# Patient Record
Sex: FEMALE | Race: WHITE | NOT HISPANIC OR LATINO
[De-identification: names, ages, dates, MRNs, and addresses within clinical notes are randomized per-mention and may not be internally consistent; named-entity substitution may affect disease eponyms.]

---

## 2017-12-14 ENCOUNTER — APPOINTMENT (OUTPATIENT)
Dept: OTOLARYNGOLOGY | Facility: CLINIC | Age: 62
End: 2017-12-14

## 2017-12-18 ENCOUNTER — APPOINTMENT (OUTPATIENT)
Dept: OTOLARYNGOLOGY | Facility: CLINIC | Age: 62
End: 2017-12-18
Payer: MEDICARE

## 2017-12-18 VITALS
WEIGHT: 190 LBS | BODY MASS INDEX: 34.96 KG/M2 | SYSTOLIC BLOOD PRESSURE: 165 MMHG | HEIGHT: 62 IN | DIASTOLIC BLOOD PRESSURE: 90 MMHG

## 2017-12-18 PROCEDURE — 99214 OFFICE O/P EST MOD 30 MIN: CPT | Mod: 25

## 2017-12-18 PROCEDURE — 31579 LARYNGOSCOPY TELESCOPIC: CPT

## 2018-01-06 ENCOUNTER — TRANSCRIPTION ENCOUNTER (OUTPATIENT)
Age: 63
End: 2018-01-06

## 2018-01-12 ENCOUNTER — OUTPATIENT (OUTPATIENT)
Dept: OUTPATIENT SERVICES | Facility: HOSPITAL | Age: 63
LOS: 1 days | End: 2018-01-12
Payer: MEDICARE

## 2018-01-12 VITALS
RESPIRATION RATE: 16 BRPM | SYSTOLIC BLOOD PRESSURE: 140 MMHG | DIASTOLIC BLOOD PRESSURE: 90 MMHG | WEIGHT: 181 LBS | HEIGHT: 62 IN | TEMPERATURE: 97 F | OXYGEN SATURATION: 98 % | HEART RATE: 80 BPM

## 2018-01-12 DIAGNOSIS — Z98.89 OTHER SPECIFIED POSTPROCEDURAL STATES: Chronic | ICD-10-CM

## 2018-01-12 DIAGNOSIS — Z91.040 LATEX ALLERGY STATUS: ICD-10-CM

## 2018-01-12 DIAGNOSIS — J38.7 OTHER DISEASES OF LARYNX: Chronic | ICD-10-CM

## 2018-01-12 DIAGNOSIS — J39.8 OTHER SPECIFIED DISEASES OF UPPER RESPIRATORY TRACT: ICD-10-CM

## 2018-01-12 DIAGNOSIS — R06.1 STRIDOR: ICD-10-CM

## 2018-01-12 LAB
BUN SERPL-MCNC: 6 MG/DL — LOW (ref 7–23)
CALCIUM SERPL-MCNC: 9.7 MG/DL — SIGNIFICANT CHANGE UP (ref 8.4–10.5)
CHLORIDE SERPL-SCNC: 92 MMOL/L — LOW (ref 98–107)
CO2 SERPL-SCNC: 33 MMOL/L — HIGH (ref 22–31)
CREAT SERPL-MCNC: 0.71 MG/DL — SIGNIFICANT CHANGE UP (ref 0.5–1.3)
GLUCOSE SERPL-MCNC: 86 MG/DL — SIGNIFICANT CHANGE UP (ref 70–99)
HCT VFR BLD CALC: 43.6 % — SIGNIFICANT CHANGE UP (ref 34.5–45)
HGB BLD-MCNC: 14 G/DL — SIGNIFICANT CHANGE UP (ref 11.5–15.5)
MCHC RBC-ENTMCNC: 26.7 PG — LOW (ref 27–34)
MCHC RBC-ENTMCNC: 32.1 % — SIGNIFICANT CHANGE UP (ref 32–36)
MCV RBC AUTO: 83 FL — SIGNIFICANT CHANGE UP (ref 80–100)
NRBC # FLD: 0 — SIGNIFICANT CHANGE UP
PLATELET # BLD AUTO: 503 K/UL — HIGH (ref 150–400)
PMV BLD: 9.1 FL — SIGNIFICANT CHANGE UP (ref 7–13)
POTASSIUM SERPL-MCNC: 4 MMOL/L — SIGNIFICANT CHANGE UP (ref 3.5–5.3)
POTASSIUM SERPL-SCNC: 4 MMOL/L — SIGNIFICANT CHANGE UP (ref 3.5–5.3)
RBC # BLD: 5.25 M/UL — HIGH (ref 3.8–5.2)
RBC # FLD: 14.6 % — HIGH (ref 10.3–14.5)
SODIUM SERPL-SCNC: 137 MMOL/L — SIGNIFICANT CHANGE UP (ref 135–145)
WBC # BLD: 9.28 K/UL — SIGNIFICANT CHANGE UP (ref 3.8–10.5)
WBC # FLD AUTO: 9.28 K/UL — SIGNIFICANT CHANGE UP (ref 3.8–10.5)

## 2018-01-12 PROCEDURE — 93010 ELECTROCARDIOGRAM REPORT: CPT

## 2018-01-12 RX ORDER — SODIUM CHLORIDE 9 MG/ML
1000 INJECTION, SOLUTION INTRAVENOUS
Qty: 0 | Refills: 0 | Status: DISCONTINUED | OUTPATIENT
Start: 2018-01-18 | End: 2018-02-02

## 2018-01-12 RX ORDER — SODIUM CHLORIDE 9 MG/ML
3 INJECTION INTRAMUSCULAR; INTRAVENOUS; SUBCUTANEOUS EVERY 8 HOURS
Qty: 0 | Refills: 0 | Status: DISCONTINUED | OUTPATIENT
Start: 2018-01-18 | End: 2018-02-02

## 2018-01-12 NOTE — H&P PST ADULT - LYMPHATIC
supraclavicular L/anterior cervical L/posterior cervical L/supraclavicular R/posterior cervical R/anterior cervical R

## 2018-01-12 NOTE — H&P PST ADULT - NEGATIVE BREAST SYMPTOMS
no breast lump R/no breast lump L/no nipple discharge L/no breast tenderness L/no breast tenderness R/no nipple discharge R

## 2018-01-12 NOTE — H&P PST ADULT - HISTORY OF PRESENT ILLNESS
61 y/o female presents to PST for preoperative evaluation with dx of stridor and other specified diseases of upper respiratory tract. h/o tracheal/ subglottic stenosis since 2010. s/p multiple laryngoscopies with  laser/dilation procedures since diagnosis with most recent one in April 2015.  Pt states in November her breathing got worse and stridor was with inspiration and expiration. Scheduled for Microlaryngoscopy and Bronchoscopy with Laser on 1/18/2018. 63 y/o female presents to PST for preoperative evaluation with dx of stridor and other specified diseases of upper respiratory tract. h/o tracheal/ subglottic stenosis since 2010. s/p multiple laryngoscopies with  laser/dilation procedures since diagnosis with most recent one in April 2015.  Pt states in November her breathing worsened.  She hasn't noticed any new voice changes and denies dysphagia. Scheduled for Microlaryngoscopy and Bronchoscopy with Laser on 1/18/2018.

## 2018-01-12 NOTE — H&P PST ADULT - NEGATIVE CARDIOVASCULAR SYMPTOMS
no chest pain/no claudication/no peripheral edema/no paroxysmal nocturnal dyspnea/no orthopnea/no palpitations

## 2018-01-12 NOTE — H&P PST ADULT - RESPIRATORY AND THORAX COMMENTS
dx of  other specified diseases of upper respiratory tract. see HPI dx of other specified diseases of upper respiratory tract. see HPI

## 2018-01-12 NOTE — H&P PST ADULT - NSANTHOSAYNRD_GEN_A_CORE
No. BERTO screening performed.  STOP BANG Legend: 0-2 = LOW Risk; 3-4 = INTERMEDIATE Risk; 5-8 = HIGH Risk

## 2018-01-12 NOTE — H&P PST ADULT - PSH
History of bronchoscopy    Larynx disorder  Laser surgery 04/2010, 02/2012, 02/2013, 4/2014 History of bronchoscopy    Larynx disorder  Laser surgery 04/2010, 02/2012, 02/2013, 4/2015

## 2018-01-12 NOTE — H&P PST ADULT - RS GEN PE MLT RESP DETAILS PC
with inspiration and expiration/stridor/breath sounds equal/respirations non-labored/no chest wall tenderness

## 2018-01-12 NOTE — H&P PST ADULT - NEGATIVE GENERAL SYMPTOMS
no weight gain/no fever/no polyuria/no malaise/no weight loss/no sweating/no polydipsia/no polyphagia/no fatigue/no chills

## 2018-01-12 NOTE — H&P PST ADULT - FAMILY HISTORY
Sibling  Still living? No  Family history of leukemia, Age at diagnosis: Age Unknown     Father  Still living? Unknown  Family history of thyroid cancer, Age at diagnosis: Age Unknown

## 2018-01-12 NOTE — H&P PST ADULT - NEGATIVE NEUROLOGICAL SYMPTOMS
no headache/no loss of sensation/no hemiparesis/no facial palsy/no difficulty walking/no transient paralysis/no confusion/no weakness/no paresthesias/no generalized seizures/no focal seizures/no vertigo/no loss of consciousness/no syncope/no tremors

## 2018-01-12 NOTE — H&P PST ADULT - NEGATIVE ENMT SYMPTOMS
no throat pain/no tinnitus/no gum bleeding/no vertigo/no hearing difficulty/no ear pain/no nose bleeds/no dysphagia

## 2018-01-12 NOTE — H&P PST ADULT - PMH
Benign neoplasm of larynx    Larynx disorder  Stenosis of larynx  Latex allergy    Obesity    Other specified diseases of upper respiratory tract    Stridor    Tracheal/bronchial disease    Voice disturbance

## 2018-01-12 NOTE — H&P PST ADULT - PROBLEM SELECTOR PLAN 1
Scheduled for Microlaryngoscopy and Bronchoscopy with Laser on 1/18/2018.   Pre op instructions given, pt verbalized understanding   GI prophylaxis provided

## 2018-01-12 NOTE — H&P PST ADULT - NEGATIVE GASTROINTESTINAL SYMPTOMS
no vomiting/no diarrhea/no constipation/no abdominal pain/no melena/no change in bowel habits/no nausea

## 2018-01-18 ENCOUNTER — APPOINTMENT (OUTPATIENT)
Dept: OTOLARYNGOLOGY | Facility: HOSPITAL | Age: 63
End: 2018-01-18

## 2018-01-18 ENCOUNTER — TRANSCRIPTION ENCOUNTER (OUTPATIENT)
Age: 63
End: 2018-01-18

## 2018-01-18 ENCOUNTER — OUTPATIENT (OUTPATIENT)
Dept: OUTPATIENT SERVICES | Facility: HOSPITAL | Age: 63
LOS: 1 days | Discharge: HOME | End: 2018-01-18
Payer: MEDICARE

## 2018-01-18 VITALS
OXYGEN SATURATION: 98 % | RESPIRATION RATE: 15 BRPM | HEART RATE: 80 BPM | SYSTOLIC BLOOD PRESSURE: 138 MMHG | DIASTOLIC BLOOD PRESSURE: 63 MMHG

## 2018-01-18 VITALS
SYSTOLIC BLOOD PRESSURE: 160 MMHG | HEART RATE: 95 BPM | TEMPERATURE: 98 F | OXYGEN SATURATION: 99 % | RESPIRATION RATE: 18 BRPM | WEIGHT: 181 LBS | DIASTOLIC BLOOD PRESSURE: 80 MMHG | HEIGHT: 62 IN

## 2018-01-18 DIAGNOSIS — J38.7 OTHER DISEASES OF LARYNX: Chronic | ICD-10-CM

## 2018-01-18 DIAGNOSIS — Z98.89 OTHER SPECIFIED POSTPROCEDURAL STATES: Chronic | ICD-10-CM

## 2018-01-18 DIAGNOSIS — R06.1 STRIDOR: ICD-10-CM

## 2018-01-18 PROCEDURE — 31540 LARYNGOSCOPY W/EXC OF TUMOR: CPT

## 2018-01-18 PROCEDURE — 31625 BRONCHOSCOPY W/BIOPSY(S): CPT

## 2018-01-18 RX ORDER — ONDANSETRON 8 MG/1
4 TABLET, FILM COATED ORAL ONCE
Qty: 0 | Refills: 0 | Status: DISCONTINUED | OUTPATIENT
Start: 2018-01-18 | End: 2018-01-18

## 2018-01-18 RX ORDER — ACETAMINOPHEN 500 MG
650 TABLET ORAL ONCE
Qty: 0 | Refills: 0 | Status: DISCONTINUED | OUTPATIENT
Start: 2018-01-18 | End: 2018-01-18

## 2018-01-18 RX ORDER — FENTANYL CITRATE 50 UG/ML
50 INJECTION INTRAVENOUS
Qty: 0 | Refills: 0 | Status: DISCONTINUED | OUTPATIENT
Start: 2018-01-18 | End: 2018-01-18

## 2018-01-18 RX ORDER — SODIUM CHLORIDE 9 MG/ML
1000 INJECTION, SOLUTION INTRAVENOUS
Qty: 0 | Refills: 0 | Status: DISCONTINUED | OUTPATIENT
Start: 2018-01-18 | End: 2018-02-02

## 2018-01-18 RX ORDER — ACETAMINOPHEN 500 MG
650 TABLET ORAL EVERY 6 HOURS
Qty: 0 | Refills: 0 | Status: DISCONTINUED | OUTPATIENT
Start: 2018-01-18 | End: 2018-02-02

## 2018-01-18 RX ORDER — ACETAMINOPHEN 500 MG
2 TABLET ORAL
Qty: 0 | Refills: 0 | COMMUNITY
Start: 2018-01-18

## 2018-01-18 RX ORDER — OXYCODONE AND ACETAMINOPHEN 5; 325 MG/1; MG/1
1 TABLET ORAL EVERY 6 HOURS
Qty: 0 | Refills: 0 | Status: DISCONTINUED | OUTPATIENT
Start: 2018-01-18 | End: 2018-01-18

## 2018-01-18 RX ADMIN — SODIUM CHLORIDE 3 MILLILITER(S): 9 INJECTION INTRAMUSCULAR; INTRAVENOUS; SUBCUTANEOUS at 15:03

## 2018-01-18 RX ADMIN — SODIUM CHLORIDE 100 MILLILITER(S): 9 INJECTION, SOLUTION INTRAVENOUS at 13:55

## 2018-01-18 RX ADMIN — Medication 650 MILLIGRAM(S): at 15:43

## 2018-01-18 NOTE — ASU DISCHARGE PLAN (ADULT/PEDIATRIC). - MEDICATION SUMMARY - MEDICATIONS TO TAKE
I will START or STAY ON the medications listed below when I get home from the hospital:    acetaminophen 325 mg oral tablet  -- 2 tab(s) by mouth every 6 hours, As needed, mild pain  -- Indication: For pain I will START or STAY ON the medications listed below when I get home from the hospital:    Medrol Dosepak 4 mg oral tablet  -- 1 dose(s) by mouth once a day   -- It is very important that you take or use this exactly as directed.  Do not skip doses or discontinue unless directed by your doctor.  Obtain medical advice before taking any non-prescription drugs as some may affect the action of this medication.  Take with food or milk.    -- Indication: For post op steroid    acetaminophen 325 mg oral tablet  -- 2 tab(s) by mouth every 6 hours, As needed, mild pain  -- Indication: For pain

## 2018-01-18 NOTE — ASU DISCHARGE PLAN (ADULT/PEDIATRIC). - SPECIAL INSTRUCTIONS
Cool and warm liquids that are not irritating to the throat should be given for the first day or two. Avoid hot liquids. Avoid citrus juices and milk. Advance at your own pace starting with soft foods and advancing to a regular diet. Avoid rough and scratchy foods and foods that are difficult to chew .

## 2018-01-18 NOTE — ASU DISCHARGE PLAN (ADULT/PEDIATRIC). - NOTIFY
Fever greater than 101/Persistent Nausea and Vomiting Bleeding that does not stop/Fever greater than 101/Swelling that continues/Inability to Tolerate Liquids or Foods/Persistent Nausea and Vomiting

## 2018-03-28 NOTE — ASU PATIENT PROFILE, ADULT - TEACHING/LEARNING DEVELOPMENTAL CONSIDERATIONS
--------------- APPROVED REPORT --------------





EKG Measurement

Heart [id number]

[id number]

[id number]

[id number]

[id number]



<Conclusion>

Normal sinus rhythm

Normal ECG
none

## 2018-04-12 ENCOUNTER — APPOINTMENT (OUTPATIENT)
Dept: OTOLARYNGOLOGY | Facility: CLINIC | Age: 63
End: 2018-04-12
Payer: MEDICARE

## 2018-04-12 VITALS
DIASTOLIC BLOOD PRESSURE: 79 MMHG | HEIGHT: 62 IN | BODY MASS INDEX: 36.8 KG/M2 | SYSTOLIC BLOOD PRESSURE: 148 MMHG | WEIGHT: 200 LBS

## 2018-04-12 PROCEDURE — 31579 LARYNGOSCOPY TELESCOPIC: CPT

## 2018-04-12 PROCEDURE — 99214 OFFICE O/P EST MOD 30 MIN: CPT | Mod: 25

## 2018-10-15 ENCOUNTER — APPOINTMENT (OUTPATIENT)
Dept: OTOLARYNGOLOGY | Facility: CLINIC | Age: 63
End: 2018-10-15
Payer: MEDICARE

## 2018-10-15 PROCEDURE — 31579 LARYNGOSCOPY TELESCOPIC: CPT

## 2018-10-15 PROCEDURE — 99214 OFFICE O/P EST MOD 30 MIN: CPT | Mod: 25

## 2018-11-05 PROBLEM — J39.8 OTHER SPECIFIED DISEASES OF UPPER RESPIRATORY TRACT: Chronic | Status: ACTIVE | Noted: 2018-01-12

## 2018-11-05 PROBLEM — R06.1 STRIDOR: Chronic | Status: ACTIVE | Noted: 2018-01-12

## 2018-11-06 ENCOUNTER — TRANSCRIPTION ENCOUNTER (OUTPATIENT)
Age: 63
End: 2018-11-06

## 2018-11-06 ENCOUNTER — INPATIENT (INPATIENT)
Facility: HOSPITAL | Age: 63
LOS: 2 days | Discharge: HOME | End: 2018-11-09
Attending: OTOLARYNGOLOGY | Admitting: OTOLARYNGOLOGY
Payer: MEDICARE

## 2018-11-06 VITALS
TEMPERATURE: 98 F | DIASTOLIC BLOOD PRESSURE: 90 MMHG | HEART RATE: 85 BPM | RESPIRATION RATE: 18 BRPM | SYSTOLIC BLOOD PRESSURE: 140 MMHG | OXYGEN SATURATION: 98 %

## 2018-11-06 DIAGNOSIS — Z98.89 OTHER SPECIFIED POSTPROCEDURAL STATES: Chronic | ICD-10-CM

## 2018-11-06 DIAGNOSIS — J38.7 OTHER DISEASES OF LARYNX: Chronic | ICD-10-CM

## 2018-11-06 DIAGNOSIS — R06.1 STRIDOR: ICD-10-CM

## 2018-11-06 LAB
ALBUMIN SERPL ELPH-MCNC: 4.6 G/DL — SIGNIFICANT CHANGE UP (ref 3.3–5)
ALP SERPL-CCNC: 126 U/L — HIGH (ref 40–120)
ALT FLD-CCNC: 96 U/L — HIGH (ref 4–33)
APTT BLD: 36.7 SEC — HIGH (ref 27.5–36.3)
AST SERPL-CCNC: 58 U/L — HIGH (ref 4–32)
BASOPHILS # BLD AUTO: 0.04 K/UL — SIGNIFICANT CHANGE UP (ref 0–0.2)
BASOPHILS NFR BLD AUTO: 0.4 % — SIGNIFICANT CHANGE UP (ref 0–2)
BILIRUB SERPL-MCNC: 0.9 MG/DL — SIGNIFICANT CHANGE UP (ref 0.2–1.2)
BLD GP AB SCN SERPL QL: NEGATIVE — SIGNIFICANT CHANGE UP
BUN SERPL-MCNC: 7 MG/DL — SIGNIFICANT CHANGE UP (ref 7–23)
CALCIUM SERPL-MCNC: 10.2 MG/DL — SIGNIFICANT CHANGE UP (ref 8.4–10.5)
CHLORIDE SERPL-SCNC: 96 MMOL/L — LOW (ref 98–107)
CO2 SERPL-SCNC: 30 MMOL/L — SIGNIFICANT CHANGE UP (ref 22–31)
CREAT SERPL-MCNC: 0.7 MG/DL — SIGNIFICANT CHANGE UP (ref 0.5–1.3)
EOSINOPHIL # BLD AUTO: 0.09 K/UL — SIGNIFICANT CHANGE UP (ref 0–0.5)
EOSINOPHIL NFR BLD AUTO: 0.9 % — SIGNIFICANT CHANGE UP (ref 0–6)
GLUCOSE SERPL-MCNC: 115 MG/DL — HIGH (ref 70–99)
HCT VFR BLD CALC: 46.4 % — HIGH (ref 34.5–45)
HGB BLD-MCNC: 15.2 G/DL — SIGNIFICANT CHANGE UP (ref 11.5–15.5)
IMM GRANULOCYTES # BLD AUTO: 0.03 # — SIGNIFICANT CHANGE UP
IMM GRANULOCYTES NFR BLD AUTO: 0.3 % — SIGNIFICANT CHANGE UP (ref 0–1.5)
INR BLD: 1.08 — SIGNIFICANT CHANGE UP (ref 0.88–1.17)
LYMPHOCYTES # BLD AUTO: 1.51 K/UL — SIGNIFICANT CHANGE UP (ref 1–3.3)
LYMPHOCYTES # BLD AUTO: 14.6 % — SIGNIFICANT CHANGE UP (ref 13–44)
MCHC RBC-ENTMCNC: 27 PG — SIGNIFICANT CHANGE UP (ref 27–34)
MCHC RBC-ENTMCNC: 32.8 % — SIGNIFICANT CHANGE UP (ref 32–36)
MCV RBC AUTO: 82.6 FL — SIGNIFICANT CHANGE UP (ref 80–100)
MONOCYTES # BLD AUTO: 0.61 K/UL — SIGNIFICANT CHANGE UP (ref 0–0.9)
MONOCYTES NFR BLD AUTO: 5.9 % — SIGNIFICANT CHANGE UP (ref 2–14)
NEUTROPHILS # BLD AUTO: 8.05 K/UL — HIGH (ref 1.8–7.4)
NEUTROPHILS NFR BLD AUTO: 77.9 % — HIGH (ref 43–77)
NRBC # FLD: 0 — SIGNIFICANT CHANGE UP
PLATELET # BLD AUTO: 516 K/UL — HIGH (ref 150–400)
PMV BLD: 8.9 FL — SIGNIFICANT CHANGE UP (ref 7–13)
POTASSIUM SERPL-MCNC: 4.1 MMOL/L — SIGNIFICANT CHANGE UP (ref 3.5–5.3)
POTASSIUM SERPL-SCNC: 4.1 MMOL/L — SIGNIFICANT CHANGE UP (ref 3.5–5.3)
PROT SERPL-MCNC: 7.9 G/DL — SIGNIFICANT CHANGE UP (ref 6–8.3)
PROTHROM AB SERPL-ACNC: 12.3 SEC — SIGNIFICANT CHANGE UP (ref 9.8–13.1)
RBC # BLD: 5.62 M/UL — HIGH (ref 3.8–5.2)
RBC # FLD: 14.5 % — SIGNIFICANT CHANGE UP (ref 10.3–14.5)
RH IG SCN BLD-IMP: POSITIVE — SIGNIFICANT CHANGE UP
SODIUM SERPL-SCNC: 141 MMOL/L — SIGNIFICANT CHANGE UP (ref 135–145)
WBC # BLD: 10.33 K/UL — SIGNIFICANT CHANGE UP (ref 3.8–10.5)
WBC # FLD AUTO: 10.33 K/UL — SIGNIFICANT CHANGE UP (ref 3.8–10.5)

## 2018-11-06 PROCEDURE — ZZZZZ: CPT

## 2018-11-06 PROCEDURE — 31575 DIAGNOSTIC LARYNGOSCOPY: CPT

## 2018-11-06 PROCEDURE — 99221 1ST HOSP IP/OBS SF/LOW 40: CPT | Mod: 24

## 2018-11-06 PROCEDURE — 99232 SBSQ HOSP IP/OBS MODERATE 35: CPT

## 2018-11-06 PROCEDURE — 31505 DIAGNOSTIC LARYNGOSCOPY: CPT

## 2018-11-06 PROCEDURE — 99222 1ST HOSP IP/OBS MODERATE 55: CPT | Mod: 25

## 2018-11-06 PROCEDURE — 71045 X-RAY EXAM CHEST 1 VIEW: CPT | Mod: 26

## 2018-11-06 RX ORDER — ACETAMINOPHEN 500 MG
650 TABLET ORAL EVERY 6 HOURS
Qty: 0 | Refills: 0 | Status: DISCONTINUED | OUTPATIENT
Start: 2018-11-06 | End: 2018-11-09

## 2018-11-06 RX ORDER — EPINEPHRINE 11.25MG/ML
0.5 SOLUTION, NON-ORAL INHALATION
Qty: 0 | Refills: 0 | Status: DISCONTINUED | OUTPATIENT
Start: 2018-11-06 | End: 2018-11-07

## 2018-11-06 RX ORDER — DEXAMETHASONE 0.5 MG/5ML
10 ELIXIR ORAL ONCE
Qty: 0 | Refills: 0 | Status: COMPLETED | OUTPATIENT
Start: 2018-11-06 | End: 2018-11-06

## 2018-11-06 RX ORDER — EPINEPHRINE 11.25MG/ML
0.5 SOLUTION, NON-ORAL INHALATION ONCE
Qty: 0 | Refills: 0 | Status: COMPLETED | OUTPATIENT
Start: 2018-11-06 | End: 2018-11-06

## 2018-11-06 RX ORDER — HEPARIN SODIUM 5000 [USP'U]/ML
5000 INJECTION INTRAVENOUS; SUBCUTANEOUS EVERY 8 HOURS
Qty: 0 | Refills: 0 | Status: DISCONTINUED | OUTPATIENT
Start: 2018-11-06 | End: 2018-11-09

## 2018-11-06 RX ORDER — SODIUM CHLORIDE 9 MG/ML
1000 INJECTION, SOLUTION INTRAVENOUS
Qty: 0 | Refills: 0 | Status: DISCONTINUED | OUTPATIENT
Start: 2018-11-06 | End: 2018-11-08

## 2018-11-06 RX ORDER — DEXAMETHASONE 0.5 MG/5ML
10 ELIXIR ORAL EVERY 8 HOURS
Qty: 0 | Refills: 0 | Status: DISCONTINUED | OUTPATIENT
Start: 2018-11-06 | End: 2018-11-09

## 2018-11-06 RX ORDER — INFLUENZA VIRUS VACCINE 15; 15; 15; 15 UG/.5ML; UG/.5ML; UG/.5ML; UG/.5ML
0.5 SUSPENSION INTRAMUSCULAR ONCE
Qty: 0 | Refills: 0 | Status: COMPLETED | OUTPATIENT
Start: 2018-11-06 | End: 2018-11-09

## 2018-11-06 RX ORDER — FAMOTIDINE 10 MG/ML
20 INJECTION INTRAVENOUS
Qty: 0 | Refills: 0 | Status: DISCONTINUED | OUTPATIENT
Start: 2018-11-06 | End: 2018-11-07

## 2018-11-06 RX ADMIN — HEPARIN SODIUM 5000 UNIT(S): 5000 INJECTION INTRAVENOUS; SUBCUTANEOUS at 21:50

## 2018-11-06 RX ADMIN — Medication 0.5 MILLILITER(S): at 14:04

## 2018-11-06 RX ADMIN — Medication 0.5 MILLILITER(S): at 10:37

## 2018-11-06 RX ADMIN — Medication 102 MILLIGRAM(S): at 10:37

## 2018-11-06 RX ADMIN — Medication 102 MILLIGRAM(S): at 17:30

## 2018-11-06 RX ADMIN — FAMOTIDINE 104 MILLIGRAM(S): 10 INJECTION INTRAVENOUS at 18:07

## 2018-11-06 RX ADMIN — Medication 0.5 MILLILITER(S): at 21:53

## 2018-11-06 NOTE — ED PROVIDER NOTE - PROGRESS NOTE DETAILS
AJM: ENT aware and will come see pt. she is currently stable. receiving racemic epi. no need for crash airway at this time

## 2018-11-06 NOTE — H&P ADULT - PROBLEM SELECTOR PLAN 1
1. Supplemental Oxygen  2. Follow up labs  3. Decardon 10mg q 8 IV  4. Pepcid 20 mg Bid  5. NPO p MN  6. Discussed with ENT resident and resident Discussed with Dr. Hernandez. OR Wed for possible Dilation of SGS

## 2018-11-06 NOTE — ED PROVIDER NOTE - ATTENDING CONTRIBUTION TO CARE
AJM: Patient seen with resident and agree with above note. 62F PMH subglottic stenosis s/p multiple laser ablations who p/w worsening stridor and SOB over the past 2 days. Per pt, she has had chronically worsening stridor since her last ablation in January, and is due to have another procedure at the end of November, however she feels that she cannot wait until that time. Pt is also scheduled to see CT surgery for possible trach placement but felt that she could not wait for the appointment. Denies fevers, chills, abd pain, n/v/d. No recent sick contacts. Last scoped 3 weeks ago by ENT which showed slightly enlarged mass. Here pt has stridor and horse voice. no resp distress, tripoding, drooling, accessory muscle use. will give steroids, racemic epi, ENT consult for scope. pt does not need immediate crash airway but will watch closely. pt will need admission for more definitive airway management.

## 2018-11-06 NOTE — ED PROVIDER NOTE - MEDICAL DECISION MAKING DETAILS
62F PMH subglottic stenosis requiring multiple laser ablations and dilations p/w worsening stridor and SOB with exertion concerning for worsening of stenosis. Will give decadron, nebulized epi, consult ENT and admit. Pt without respiratory distress at this time but will watch closely while pt in ED.

## 2018-11-06 NOTE — CONSULT NOTE ADULT - ASSESSMENT
62F w/ recurrent subglottic stenosis    - For ENT intervention tomorrow  - Recommend Heliox therapy available for worsening SOB/stridor  - Dr. Durant is available in case of any issues    VALENTE Del Castillo MD  Thoracic Surgery [id number]
no

## 2018-11-06 NOTE — CONSULT NOTE ADULT - ATTENDING COMMENTS
Patient seen and examined and agree with above.  I have reviewed PMH, PSH, meds, labs and imaging.   72 year old female with 9 year history of subglottic stenosis and monthly dilations by ENT.  The patient has been increasingly hoarse and stridorous and presented to the ED.  Admitted ot the SICU for respiratory airway monitoring. Currently does have stridor which improves with racemic epinephrine. She is also receiving decadron q 8 hours.   Weill monitor closely.

## 2018-11-06 NOTE — CONSULT NOTE ADULT - SUBJECTIVE AND OBJECTIVE BOX
GENERAL SURGERY CONSULT NOTE    Patient is a 62y old  Female who presents with a chief complaint of     HPI:  72 year old female with a history of Subglottic stenosis of unknown origin. She is followed by Dr. Cartagena for  laser surgery once a year. Also had a procedure with Dr. Cartagena and Dr. Josue several years ago. Patient c/o  SOB, and worsening dyspnea over the past several months but now states she is unable to ambulate more then a few feet   without getting SOB. Denies any cough, fevers, chills, N/V or any other complaints.   She was scheduled to see Dr. Liriano in the office tomorrow. By the time of my exam she said she is feeling a bit better.    Per ENT Scope:  No base of tongue edema, No masses, epiglottis sharp.  Left vocal cord hypomobile, R vocal cord normal movement   + Subglottic stenosis. (06 Nov 2018 11:51)          PAST MEDICAL & SURGICAL HISTORY:  Other specified diseases of upper respiratory tract  Stridor  Latex allergy  Tracheal/bronchial disease  Benign neoplasm of larynx  Voice disturbance  Obesity  Larynx disorder: Stenosis of larynx  History of bronchoscopy  Larynx disorder: Laser surgery 04/2010, 02/2012, 02/2013, 4/2015    [  ] No significant past history as reviewed with the patient and family    FAMILY HISTORY:  Family history of leukemia (Sibling): sister  Family history of thyroid cancer (Father)    [  ] Family history not pertinent as reviewed with the patient and family    SOCIAL HISTORY:    MEDICATIONS  (STANDING):    MEDICATIONS  (PRN):    Allergies    Burning and itching: Latex (Unknown)  latex (Unknown)  No Known Drug Allergies    Intolerances        Vital Signs Last 24 Hrs  T(C): 36.7 (06 Nov 2018 09:46), Max: 36.7 (06 Nov 2018 09:46)  T(F): 98 (06 Nov 2018 09:46), Max: 98 (06 Nov 2018 09:46)  HR: 85 (06 Nov 2018 09:46) (85 - 85)  BP: 140/90 (06 Nov 2018 09:46) (140/90 - 140/90)  BP(mean): --  RR: 18 (06 Nov 2018 09:46) (18 - 18)  SpO2: 98% (06 Nov 2018 09:46) (98% - 98%)  Daily     Daily     NAD, awake and alert  No rashes  No jaundice or scleral icterus  Respirations nonlabored  CV Regular  Abdomen soft, nontender, nondistended  No guarding or rebound tenderness  Extremities warm                         15.2   10.33 )-----------( 516      ( 06 Nov 2018 10:10 )             46.4     11-06    141  |  96<L>  |  7   ----------------------------<  115<H>  4.1   |  30  |  0.70    Ca    10.2      06 Nov 2018 10:10    TPro  7.9  /  Alb  4.6  /  TBili  0.9  /  DBili  x   /  AST  58<H>  /  ALT  96<H>  /  AlkPhos  126<H>  11-06    PT/INR - ( 06 Nov 2018 10:10 )   PT: 12.3 SEC;   INR: 1.08          PTT - ( 06 Nov 2018 10:10 )  PTT:36.7 SEC      IMAGING STUDIES:  < from: Xray Chest 1 View- PORTABLE-Urgent (11.06.18 @ 10:36) >    FINDINGS:  Both lungs are equally aerated and free of any focal abnormalities. The   heart is not enlarged and there is no effusion.        COMPARISON:  March 27, 2015        IMPRESSION:  No acute pulmonary disease.    < end of copied text >

## 2018-11-06 NOTE — ED PROVIDER NOTE - PHYSICAL EXAMINATION
Gen: AOx3  Head: NCAT  HEENT: PERRL, oral mucosa moist, normal conjunctiva, oropharynx clear without exudate or erythema; +insp and exp stridor  Lung: transmitted upper airway sounds, diffusely decreased breath sounds, no respiratory distress, no wheezing, rales, rhonchi, no accessory muscle use  CV: normal s1/s2, rrr, no murmurs, Normal perfusion, pulses 2+ throughout  Abd: soft, NTND, no CVA tenderness  MSK: No edema, no visible deformities, full range of motion in all 4 extremities  Neuro: CN II-XII grossly intact, No focal neurologic deficits  Skin: No rash   Psych: normal affect

## 2018-11-06 NOTE — H&P ADULT - HISTORY OF PRESENT ILLNESS
72 year old female with a history of Subglottic stenosis for 10 years. Patient has been followed by Dr. Cartagena for  laser surgery once a year. Also had a procedure with Dr. Cartagena and Dr. Josue several years ago. Patient c/o  SOB, and worsening dyspnea over the past several months but now states she is unable to ambulate more then a few feet   without getting SOB. Denies any cough, fevers, chills, N/V or any other complaints.     AVSS. P02 98% room air  HEENT:  Mouth;   No floor of mouth edema, No exudates, No injection,   Scope:  No base of tongue edema, No masses, epiglottis sharp.  Left vocal cord hypomobile, R vocal cord normal movement   + Subglottic stenosis.

## 2018-11-06 NOTE — ED PROVIDER NOTE - OBJECTIVE STATEMENT
62F PMH subglottic stenosis     PMH/PSH: subglottic stenosis s/p tracheal dilation  Medications:  Allergies:  PMD: 62F PMH subglottic stenosis s/p multiple laser ablations who p/w worsening stridor and SOB over the past 2 days. Per pt, she has had chronically worsening stridor since her last ablation in January, and is due to have another procedure at the end of November, however she feels that she cannot wait until that time. Pt is also scheduled to see CT surgery for possible trach placement but felt that she could not wait for the appointment. Denies fevers, chills, abd pain, n/v/d. No recent sick contacts. Last scoped 3 weeks ago by ENT.     PMH/PSH: subglottic stenosis s/p tracheal dilation  Medications: none  Allergies: NKDA  PMD: ENT Dr. Cartagena

## 2018-11-06 NOTE — ED ADULT NURSE NOTE - OBJECTIVE STATEMENT
Pt is a 62 year old female reporting SOB. Pt has a PMH of glottis stenosis. Pt reports increased SOB for  weeks. Pt scheduled for laser surgery november 9, but pt states her SOB increased and she cant wait. Pt denies any trauma to the area, no previous intubation or smoking. Pt AOX4. Pt breathing has stridor, respirations are even and unlabored rate 12. Pt lung sounds are clear bilaterally. Pt is a 62 year old female reporting SOB. Pt has a PMH of glottis stenosis. Pt reports increased SOB for  weeks. Pt scheduled for laser surgery november 9, but pt states her SOB increased and she cant wait. Pt denies any trauma to the area, no previous intubation or smoking. Pt AOX4. Pt breathing is stridor, respirations are even and unlabored rate 12. Pt lung sounds are clear bilaterally Spo2 97 %. Pt Denies any chest pain and reports increased SOB on exertion. Pt pulses are even bilaterally. Pt skin is flesh tone, warm and intact. No edema or cyanosis noted. 20 g iv placed in right fore arm, labs drawn, pending review. Pt placed on cardiac monitor, will continue to monitor.

## 2018-11-06 NOTE — CONSULT NOTE ADULT - SUBJECTIVE AND OBJECTIVE BOX
SICU Consultation Note  =====================================================  HPI:  72 year old female with a history of Subglottic stenosis for 10 years with no other significant PMH presents to the ED for SOB. Patient has been followed by Dr. Cartagena for  laser surgery once a year, pt does well after surgery, does not need home O2, only occasional humidified air. Starting 1 month ago pt started to become short of breath along with a dry cough; several days ago shortness of breath became acutely worse. Pt realized she couldn't walk to the bathroom without become SOB so came to hospital. Denies fever, chills, N/V.     In the ED pt received 1x of epinephrine and decadron, found to have biphasic stridor but sating well on RA. Able to speak and mentating well. Planned for surgery with Dr. Hernandez tomorrow morning. Brought to SICU for closer airway monitoring.       Surgery Information  OR time:      EBL:          IV Fluids:       Blood Products:   UOP:          PAST MEDICAL & SURGICAL HISTORY:  Other specified diseases of upper respiratory tract  Stridor  Latex allergy  Tracheal/bronchial disease  Benign neoplasm of larynx  Voice disturbance  Obesity  Larynx disorder: Stenosis of larynx  History of bronchoscopy  Larynx disorder: Laser surgery 04/2010, 02/2012, 02/2013, 4/2015    Home Meds: Home Medications:  acetaminophen 325 mg oral tablet: 2 tab(s) orally every 6 hours, As needed, mild pain (18 Jan 2018 13:35)    Allergies: Allergies    Burning and itching: Latex (Unknown)  latex (Unknown)  No Known Drug Allergies    Intolerances      Soc:   Advanced Directives: Presumed Full Code     ROS:    REVIEW OF SYSTEMS    [ X] A ten-point review of systems was otherwise negative except as noted.  [ ] Due to altered mental status/intubation, subjective information were not able to be obtained from the patient. History was obtained, to the extent possible, from review of the chart and collateral sources of information.      CURRENT MEDICATIONS:   --------------------------------------------------------------------------------------  Neurologic Medications    Respiratory Medications  racepinephrine  2.25% Inhalation 0.5 milliLiter(s) Inhalation once    Cardiovascular Medications    Gastrointestinal Medications  famotidine  IVPB 20 milliGRAM(s) IV Intermittent two times a day    Genitourinary Medications    Hematologic/Oncologic Medications    Antimicrobial/Immunologic Medications    Endocrine/Metabolic Medications  dexamethasone  IVPB 10 milliGRAM(s) IV Intermittent every 8 hours    Topical/Other Medications    --------------------------------------------------------------------------------------    VITAL SIGNS, INS/OUTS (last 24 hours):  --------------------------------------------------------------------------------------  ICU Vital Signs Last 24 Hrs  T(C): 36.7 (06 Nov 2018 09:46), Max: 36.7 (06 Nov 2018 09:46)  T(F): 98 (06 Nov 2018 09:46), Max: 98 (06 Nov 2018 09:46)  HR: 85 (06 Nov 2018 09:46) (85 - 85)  BP: 140/90 (06 Nov 2018 09:46) (140/90 - 140/90)  BP(mean): --  ABP: --  ABP(mean): --  RR: 18 (06 Nov 2018 09:46) (18 - 18)  SpO2: 98% (06 Nov 2018 09:46) (98% - 98%)    I&O's Summary    --------------------------------------------------------------------------------------    EXAM:  RASS: 0  GCS:   Exam: Normal, NAD, alert, oriented x 3, no focal deficits. PERRLA     Respiratory  Exam: Loud biphasic stridor, inspiratory worse than expiratory. Supraclavicular retractions. Able to speak in short sentences.  Occasional coughing.       Cardiovascular  Exam: S1, S2.  Regular rate and rhythm.  No Peripheral edema    Cardiac Rhythm: Normal Sinus Rhythm      GI  Exam: Abdomen soft, Non-tender, Non-distended.          Tubes/Lines/Drains  ***  [x] Peripheral IV  [] Central Venous Line     	[] R	[] L	[] IJ	[] Fem	[] SC        Type:	    Date Placed:   [] Arterial Line		[] R	[] L	[] Fem	[] Rad	[] Ax	Date Placed:   [] PICC:         	[] Midline		[] Mediport           [] Urinary Catheter		Date Placed:     Extremities  Exam: Extremities warm, pink, well-perfused.        Derm:  Exam: Good skin turgor, no skin breakdown.          LABS  --------------------------------------------------------------------------------------  Labs:  CAPILLARY BLOOD GLUCOSE                              15.2   10.33 )-----------( 516      ( 06 Nov 2018 10:10 )             46.4       Auto Neutrophil %: 77.9 % (11-06-18 @ 10:10)  Auto Immature Granulocyte %: 0.3 % (11-06-18 @ 10:10)    11-06    141  |  96<L>  |  7   ----------------------------<  115<H>  4.1   |  30  |  0.70      eGFR if Non : 92 mL/min (11-06-18 @ 10:10)      LFTs:             7.9  | 0.9  | 58       ------------------[126     ( 06 Nov 2018 10:10 )  4.6  | x    | 96          Lipase:x      Amylase:x             Coags:     12.3   ----< 1.08    ( 06 Nov 2018 10:10 )     36.7                  --------------------------------------------------------------------------------------    OTHER LABS    IMAGING RESULTS  < from: Xray Chest 1 View- PORTABLE-Urgent (11.06.18 @ 10:36) >  FINDINGS:  Both lungs are equally aerated and free of any focal abnormalities. The   heart is not enlarged and there is no effusion.    < end of copied text >      ASSESSMENT:      PLAN:   Neurologic: AO x3, no focal deficits. Denies any pain.    Respiratory: Biphasic stridor, sating well on RA. No tachypneic and able to speak.   Racepinephrine .5mL inhalation stat  Decadron 10mg Q8 IV Q6hr    Cardiovascular: Slightly hypertensive. Will monitor. No home BP meds.     Gastrointestinal/Nutrition: NPO for now given airway risk.  NPO after midnight.     Renal/Genitourinary: BUN/ Creatinine stable. Electrolytes WNL. Continue to monitor.     Hematologic: H/H stable. No acute signs of bleeding.     Infectious Disease: Afebrile. No leukocytosis.     Lines/Tubes: PIV    Endocrine: No issues.     Disposition: SICU    --------------------------------------------------------------------------------------    Critical Care Diagnoses: Subglottic stenosis, critical airway.

## 2018-11-06 NOTE — ED ADULT TRIAGE NOTE - CHIEF COMPLAINT QUOTE
p/t with hx glottic stenosis c/o of increased sob for past few days,  sent by PMD for eval, p/t denies any chest pain, no signs of acute distress @ present

## 2018-11-07 ENCOUNTER — APPOINTMENT (OUTPATIENT)
Dept: THORACIC SURGERY | Facility: CLINIC | Age: 63
End: 2018-11-07

## 2018-11-07 ENCOUNTER — RESULT REVIEW (OUTPATIENT)
Age: 63
End: 2018-11-07

## 2018-11-07 ENCOUNTER — APPOINTMENT (OUTPATIENT)
Dept: OTOLARYNGOLOGY | Facility: HOSPITAL | Age: 63
End: 2018-11-07

## 2018-11-07 LAB
APTT BLD: 25.4 SEC — LOW (ref 27.5–36.3)
BLD GP AB SCN SERPL QL: NEGATIVE — SIGNIFICANT CHANGE UP
BUN SERPL-MCNC: 13 MG/DL — SIGNIFICANT CHANGE UP (ref 7–23)
CALCIUM SERPL-MCNC: 9.7 MG/DL — SIGNIFICANT CHANGE UP (ref 8.4–10.5)
CHLORIDE SERPL-SCNC: 97 MMOL/L — LOW (ref 98–107)
CO2 SERPL-SCNC: 29 MMOL/L — SIGNIFICANT CHANGE UP (ref 22–31)
CREAT SERPL-MCNC: 0.66 MG/DL — SIGNIFICANT CHANGE UP (ref 0.5–1.3)
GLUCOSE SERPL-MCNC: 147 MG/DL — HIGH (ref 70–99)
HCT VFR BLD CALC: 42.2 % — SIGNIFICANT CHANGE UP (ref 34.5–45)
HGB BLD-MCNC: 14.2 G/DL — SIGNIFICANT CHANGE UP (ref 11.5–15.5)
INR BLD: 1.08 — SIGNIFICANT CHANGE UP (ref 0.88–1.17)
MAGNESIUM SERPL-MCNC: 2.2 MG/DL — SIGNIFICANT CHANGE UP (ref 1.6–2.6)
MCHC RBC-ENTMCNC: 27.4 PG — SIGNIFICANT CHANGE UP (ref 27–34)
MCHC RBC-ENTMCNC: 33.6 % — SIGNIFICANT CHANGE UP (ref 32–36)
MCV RBC AUTO: 81.3 FL — SIGNIFICANT CHANGE UP (ref 80–100)
NRBC # FLD: 0 — SIGNIFICANT CHANGE UP
PHOSPHATE SERPL-MCNC: 4 MG/DL — SIGNIFICANT CHANGE UP (ref 2.5–4.5)
PLATELET # BLD AUTO: 512 K/UL — HIGH (ref 150–400)
PMV BLD: 9.3 FL — SIGNIFICANT CHANGE UP (ref 7–13)
POTASSIUM SERPL-MCNC: 3.9 MMOL/L — SIGNIFICANT CHANGE UP (ref 3.5–5.3)
POTASSIUM SERPL-SCNC: 3.9 MMOL/L — SIGNIFICANT CHANGE UP (ref 3.5–5.3)
PROTHROM AB SERPL-ACNC: 12.3 SEC — SIGNIFICANT CHANGE UP (ref 9.8–13.1)
RBC # BLD: 5.19 M/UL — SIGNIFICANT CHANGE UP (ref 3.8–5.2)
RBC # FLD: 14.6 % — HIGH (ref 10.3–14.5)
RH IG SCN BLD-IMP: POSITIVE — SIGNIFICANT CHANGE UP
SODIUM SERPL-SCNC: 141 MMOL/L — SIGNIFICANT CHANGE UP (ref 135–145)
WBC # BLD: 9.72 K/UL — SIGNIFICANT CHANGE UP (ref 3.8–10.5)
WBC # FLD AUTO: 9.72 K/UL — SIGNIFICANT CHANGE UP (ref 3.8–10.5)

## 2018-11-07 PROCEDURE — 31571 LARYNGOSCOP W/VC INJ + SCOPE: CPT

## 2018-11-07 PROCEDURE — 99232 SBSQ HOSP IP/OBS MODERATE 35: CPT

## 2018-11-07 PROCEDURE — 31641 BRONCHOSCOPY TREAT BLOCKAGE: CPT

## 2018-11-07 PROCEDURE — 88305 TISSUE EXAM BY PATHOLOGIST: CPT | Mod: 26

## 2018-11-07 PROCEDURE — 31541 LARYNSCOP W/TUMR EXC + SCOPE: CPT | Mod: 59

## 2018-11-07 PROCEDURE — 71045 X-RAY EXAM CHEST 1 VIEW: CPT | Mod: 26

## 2018-11-07 RX ORDER — BUDESONIDE, MICRONIZED 100 %
0.5 POWDER (GRAM) MISCELLANEOUS
Qty: 0 | Refills: 0 | Status: DISCONTINUED | OUTPATIENT
Start: 2018-11-07 | End: 2018-11-09

## 2018-11-07 RX ORDER — CHLORHEXIDINE GLUCONATE 213 G/1000ML
1 SOLUTION TOPICAL
Qty: 0 | Refills: 0 | Status: DISCONTINUED | OUTPATIENT
Start: 2018-11-07 | End: 2018-11-09

## 2018-11-07 RX ORDER — BUDESONIDE AND FORMOTEROL FUMARATE DIHYDRATE 160; 4.5 UG/1; UG/1
2 AEROSOL RESPIRATORY (INHALATION)
Qty: 0 | Refills: 0 | Status: DISCONTINUED | OUTPATIENT
Start: 2018-11-07 | End: 2018-11-07

## 2018-11-07 RX ORDER — BUDESONIDE, MICRONIZED 100 %
0.5 POWDER (GRAM) MISCELLANEOUS ONCE
Qty: 0 | Refills: 0 | Status: COMPLETED | OUTPATIENT
Start: 2018-11-07 | End: 2018-11-07

## 2018-11-07 RX ORDER — PANTOPRAZOLE SODIUM 20 MG/1
40 TABLET, DELAYED RELEASE ORAL
Qty: 0 | Refills: 0 | Status: DISCONTINUED | OUTPATIENT
Start: 2018-11-07 | End: 2018-11-09

## 2018-11-07 RX ORDER — ACETAMINOPHEN 500 MG
1000 TABLET ORAL ONCE
Qty: 0 | Refills: 0 | Status: COMPLETED | OUTPATIENT
Start: 2018-11-07 | End: 2018-11-07

## 2018-11-07 RX ADMIN — HEPARIN SODIUM 5000 UNIT(S): 5000 INJECTION INTRAVENOUS; SUBCUTANEOUS at 21:34

## 2018-11-07 RX ADMIN — Medication 1000 MILLIGRAM(S): at 21:04

## 2018-11-07 RX ADMIN — Medication 400 MILLIGRAM(S): at 20:34

## 2018-11-07 RX ADMIN — Medication 102 MILLIGRAM(S): at 11:35

## 2018-11-07 RX ADMIN — FAMOTIDINE 104 MILLIGRAM(S): 10 INJECTION INTRAVENOUS at 05:55

## 2018-11-07 RX ADMIN — SODIUM CHLORIDE 75 MILLILITER(S): 9 INJECTION, SOLUTION INTRAVENOUS at 14:45

## 2018-11-07 RX ADMIN — Medication 0.5 MILLIGRAM(S): at 23:08

## 2018-11-07 RX ADMIN — SODIUM CHLORIDE 75 MILLILITER(S): 9 INJECTION, SOLUTION INTRAVENOUS at 00:00

## 2018-11-07 RX ADMIN — SODIUM CHLORIDE 75 MILLILITER(S): 9 INJECTION, SOLUTION INTRAVENOUS at 08:09

## 2018-11-07 RX ADMIN — HEPARIN SODIUM 5000 UNIT(S): 5000 INJECTION INTRAVENOUS; SUBCUTANEOUS at 05:55

## 2018-11-07 RX ADMIN — Medication 102 MILLIGRAM(S): at 02:36

## 2018-11-07 RX ADMIN — HEPARIN SODIUM 5000 UNIT(S): 5000 INJECTION INTRAVENOUS; SUBCUTANEOUS at 14:44

## 2018-11-07 NOTE — PRE-OP CHECKLIST - SELECT TESTS ORDERED
PT/PTT/BMP/CBC/INR INR/PT/PTT/BMP/CXR/CBC INR/CBC/PT/PTT/CXR/Type and Screen/BMP Type and Screen/CBC/PT/PTT/BMP/CXR/INR/EKG

## 2018-11-07 NOTE — PROGRESS NOTE ADULT - SUBJECTIVE AND OBJECTIVE BOX
HISTORY  62y Female with a history of subglottic stenosis for 10 years with no other significant PMH presented to the [facility] ED for SOB on 11/6. Patient has been followed by Dr. Cartagena for laser surgery (ablation) once a year, pt does well after surgery, does not need home O2, only occasional humidified air. Starting 1 month ago pt started to become short of breath along with a dry cough; several days ago shortness of breath became acutely worse. Pt realized she couldn't walk to the bathroom without become SOB so came to hospital. Found to have biphasic stridor in the ED but saturating well on room air. Transferred to the SICU for airway monitoring.   24 HOUR EVENTS: Tolerated room air with racemic epinephrine PRN and decadron. NPO for OR today.    SUBJECTIVE/ROS:  [X] A ten-point review of systems was otherwise negative except as noted.  [ ] Due to altered mental status/intubation, subjective information were not able to be obtained from the patient. History was obtained, to the extent possible, from review of the chart and collateral sources of information.      NEURO  RASS: 0 GCS: 15  Exam: Normal, NAD, alert, oriented x 3, no focal deficits.    Meds: acetaminophen   Tablet .. 650 milliGRAM(s) Oral every 6 hours PRN Mild Pain (1 - 3)    [x] Adequacy of sedation and pain control has been assessed and adjusted      RESPIRATORY  RR: 17 (11-07-18 @ 00:00) (11 - 19)  SpO2: 97% (11-07-18 @ 00:00) (96% - 100%)  Wt(kg): --  Exam: Loud biphasic stridor, inspiratory worse than expiratory. Supraclavicular retractions. Able to speak in short sentences. Occasional coughing.    Meds: racepinephrine  2.25% Inhalation 0.5 milliLiter(s) Inhalation every 3 hours PRN shortness of breath/stridor        CARDIOVASCULAR  HR: 73 (11-07-18 @ 00:00) (73 - 99)  BP: 145/72 (11-07-18 @ 00:00) (126/74 - 190/82)  BP(mean): 92 (11-07-18 @ 00:00) (80 - 110)  ABP: --  ABP(mean): --  Wt(kg): --  CVP(cm H2O): --      Exam: S1, S2.  Regular rate and rhythm. No Peripheral edema    Cardiac Rhythm: Normal Sinus Rhythm     Perfusion     [X]Adequate   [ ]Inadequate  Mentation   [X]Normal       [ ]Reduced  Extremities  [X]Warm         [ ]Cool  Volume Status [ ]Hypervolemic [X]Euvolemic [ ]Hypovolemic  Meds:         GI/NUTRITION  Exam: Abdomen soft, Non-tender, Non-distended.    Diet: NPO  Meds: famotidine  IVPB 20 milliGRAM(s) IV Intermittent two times a day      GENITOURINARY  I&O's Detail    11-06 @ 07:01  -  11-07 @ 00:17  --------------------------------------------------------  IN:    IV PiggyBack: 100 mL  Total IN: 100 mL    OUT:    Voided: 625 mL  Total OUT: 625 mL    Total NET: -525 mL          11-06    141  |  96<L>  |  7   ----------------------------<  115<H>  4.1   |  30  |  0.70    Ca    10.2      06 Nov 2018 10:10    TPro  7.9  /  Alb  4.6  /  TBili  0.9  /  DBili  x   /  AST  58<H>  /  ALT  96<H>  /  AlkPhos  126<H>  11-06    [ ] Alarcon catheter, indication: N/A  Meds: lactated ringers. 1000 milliLiter(s) IV Continuous <Continuous>        HEMATOLOGIC  Meds: heparin  Injectable 5000 Unit(s) SubCutaneous every 8 hours    [x] VTE Prophylaxis                        15.2   10.33 )-----------( 516      ( 06 Nov 2018 10:10 )             46.4     PT/INR - ( 06 Nov 2018 10:10 )   PT: 12.3 SEC;   INR: 1.08          PTT - ( 06 Nov 2018 10:10 )  PTT:36.7 SEC  Transfusion     [ ] PRBC   [ ] Platelets   [ ] FFP   [ ] Cryoprecipitate      INFECTIOUS DISEASES  T(C): 36.7 (11-07-18 @ 00:00), Max: 36.7 (11-06-18 @ 09:46)  Wt(kg): --  WBC Count: 10.33 K/uL (11-06 @ 10:10)    Recent Cultures:    Meds: influenza   Vaccine 0.5 milliLiter(s) IntraMuscular once        ENDOCRINE  CAPILLARY BLOOD GLUCOSE        Meds: dexamethasone  IVPB 10 milliGRAM(s) IV Intermittent every 8 hours        ACCESS DEVICES:  [X] Peripheral IV  [ ] Central Venous Line	[ ] R	[ ] L	[ ] IJ	[ ] Fem	[ ] SC	Placed:   [ ] Arterial Line		[ ] R	[ ] L	[ ] Fem	[ ] Rad	[ ] Ax	Placed:   [ ] PICC:					[ ] Mediport  [ ] Urinary Catheter, Date Placed:   [ ] Necessity of urinary, arterial, and venous catheters discussed HISTORY  62y Female with a history of subglottic stenosis for 10 years with no other significant PMH presented to the [facility] ED for SOB on 11/6. Patient has been followed by Dr. Cartagena for laser surgery (ablation) once a year, pt does well after surgery, does not need home O2, only occasional humidified air. Starting 1 month ago pt started to become short of breath along with a dry cough; several days ago shortness of breath became acutely worse. Pt realized she couldn't walk to the bathroom without become SOB so came to hospital. Found to have biphasic stridor in the ED but saturating well on room air. Transferred to the SICU for airway monitoring.   24 HOUR EVENTS: Tolerated room air with racemic epinephrine PRN and decadron. NPO for OR today.    SUBJECTIVE/ROS:  [X] A ten-point review of systems was otherwise negative except as noted.  [ ] Due to altered mental status/intubation, subjective information were not able to be obtained from the patient. History was obtained, to the extent possible, from review of the chart and collateral sources of information.      NEURO  RASS: 0 GCS: 15  Exam: Normal, NAD, alert, oriented x 3, no focal deficits.    Meds: acetaminophen   Tablet .. 650 milliGRAM(s) Oral every 6 hours PRN Mild Pain (1 - 3)    [x] Adequacy of sedation and pain control has been assessed and adjusted      RESPIRATORY  RR: 17 (11-07-18 @ 00:00) (11 - 19)  SpO2: 97% (11-07-18 @ 00:00) (96% - 100%)  Wt(kg): --  Exam: Loud biphasic stridor, inspiratory worse than expiratory. Supraclavicular retractions. Able to speak in short sentences. Occasional coughing.    Meds: racepinephrine  2.25% Inhalation 0.5 milliLiter(s) Inhalation every 3 hours PRN shortness of breath/stridor        CARDIOVASCULAR  HR: 73 (11-07-18 @ 00:00) (73 - 99)  BP: 145/72 (11-07-18 @ 00:00) (126/74 - 190/82)  BP(mean): 92 (11-07-18 @ 00:00) (80 - 110)  ABP: --  ABP(mean): --  Wt(kg): --  CVP(cm H2O): --      Exam: S1, S2.  Regular rate and rhythm. No Peripheral edema    Cardiac Rhythm: Normal Sinus Rhythm     Perfusion     [X]Adequate   [ ]Inadequate  Mentation   [X]Normal       [ ]Reduced  Extremities  [X]Warm         [ ]Cool  Volume Status [ ]Hypervolemic [X]Euvolemic [ ]Hypovolemic  Meds:         GI/NUTRITION  Exam: Abdomen soft, Non-tender, Non-distended.    Diet: NPO  Meds: famotidine  IVPB 20 milliGRAM(s) IV Intermittent two times a day      GENITOURINARY  I&O's Detail    11-06 @ 07:01  -  11-07 @ 00:17  --------------------------------------------------------  IN:    IV PiggyBack: 100 mL  Total IN: 100 mL    OUT:    Voided: 625 mL  Total OUT: 625 mL    Total NET: -525 mL      11-07    141  |  97<L>  |  13  ----------------------------<  147<H>  3.9   |  29  |  0.66    Ca    9.7      07 Nov 2018 02:35  Phos  4.0     11-07  Mg     2.2     11-07    TPro  7.9  /  Alb  4.6  /  TBili  0.9  /  DBili  x   /  AST  58<H>  /  ALT  96<H>  /  AlkPhos  126<H>  11-06      [ ] Alarcon catheter, indication: N/A  Meds: lactated ringers. 1000 milliLiter(s) IV Continuous <Continuous>        HEMATOLOGIC  Meds: heparin  Injectable 5000 Unit(s) SubCutaneous every 8 hours    [x] VTE Prophylaxis                          14.2   9.72  )-----------( 512      ( 07 Nov 2018 02:35 )             42.2     PT/INR - ( 06 Nov 2018 10:10 )   PT: 12.3 SEC;   INR: 1.08     PTT - ( 06 Nov 2018 10:10 )  PTT:36.7 SEC    Transfusion     [ ] PRBC   [ ] Platelets   [ ] FFP   [ ] Cryoprecipitate      INFECTIOUS DISEASES  T(C): 36.7 (11-07-18 @ 00:00), Max: 36.7 (11-06-18 @ 09:46)  Wt(kg): --  WBC Count:  9.72 K/uL (11-07)    Recent Cultures:    Meds: influenza   Vaccine 0.5 milliLiter(s) IntraMuscular once        ENDOCRINE  CAPILLARY BLOOD GLUCOSE      Meds: dexamethasone  IVPB 10 milliGRAM(s) IV Intermittent every 8 hours  	    ACCESS DEVICES:  [X] Peripheral IV  [ ] Central Venous Line	[ ] R	[ ] L	[ ] IJ	[ ] Fem	[ ] SC	Placed:   [ ] Arterial Line		[ ] R	[ ] L	[ ] Fem	[ ] Rad	[ ] Ax	Placed:   [ ] PICC:					[ ] Mediport  [ ] Urinary Catheter, Date Placed:   [ ] Necessity of urinary, arterial, and venous catheters discussed

## 2018-11-07 NOTE — PROGRESS NOTE ADULT - ASSESSMENT
ASSESSMENT: 71 y/o F PMH of subglottic stenosis receiving regular laser/dilation procedures presented to the [facility] ED with shortness of breath, worsening on exertion 11/6. Found to be stridorous but improved with racemic epinephrine and decadron. Transferred to SICU for airway monitoring. Tolerating room air. Planned for OR today with ENT.     PLAN:     Neurologic:   - prn pain control with tylenol    Pulmonary:  - continuous O2 sat monitoring  - q8h decardon, PRN racemic epinephrine  - airway cart outside room  - for OR today with ENT    Cardiovascular:  - no acute issues    Gastrointestinal:   - NPO after midnight  - home famotidine    Renal:   - trend BUN/Cr  - replete electrolytes PRN    Hematologic:   -subcutaneous heparin    Infectious Disease:   - afebrile    Endocrine:   -on decadron q8h    Critical Care Diagnoses: Subglottic stenosis, critical airway.

## 2018-11-08 ENCOUNTER — TRANSCRIPTION ENCOUNTER (OUTPATIENT)
Age: 63
End: 2018-11-08

## 2018-11-08 LAB
ALBUMIN SERPL ELPH-MCNC: 3.6 G/DL — SIGNIFICANT CHANGE UP (ref 3.3–5)
ALP SERPL-CCNC: 100 U/L — SIGNIFICANT CHANGE UP (ref 40–120)
ALT FLD-CCNC: 65 U/L — HIGH (ref 4–33)
AST SERPL-CCNC: 24 U/L — SIGNIFICANT CHANGE UP (ref 4–32)
BILIRUB SERPL-MCNC: 0.4 MG/DL — SIGNIFICANT CHANGE UP (ref 0.2–1.2)
BUN SERPL-MCNC: 19 MG/DL — SIGNIFICANT CHANGE UP (ref 7–23)
CALCIUM SERPL-MCNC: 9.4 MG/DL — SIGNIFICANT CHANGE UP (ref 8.4–10.5)
CHLORIDE SERPL-SCNC: 100 MMOL/L — SIGNIFICANT CHANGE UP (ref 98–107)
CO2 SERPL-SCNC: 27 MMOL/L — SIGNIFICANT CHANGE UP (ref 22–31)
CREAT SERPL-MCNC: 0.68 MG/DL — SIGNIFICANT CHANGE UP (ref 0.5–1.3)
GLUCOSE SERPL-MCNC: 158 MG/DL — HIGH (ref 70–99)
HCT VFR BLD CALC: 38.9 % — SIGNIFICANT CHANGE UP (ref 34.5–45)
HGB BLD-MCNC: 13.1 G/DL — SIGNIFICANT CHANGE UP (ref 11.5–15.5)
MAGNESIUM SERPL-MCNC: 2.3 MG/DL — SIGNIFICANT CHANGE UP (ref 1.6–2.6)
MCHC RBC-ENTMCNC: 27.7 PG — SIGNIFICANT CHANGE UP (ref 27–34)
MCHC RBC-ENTMCNC: 33.7 % — SIGNIFICANT CHANGE UP (ref 32–36)
MCV RBC AUTO: 82.2 FL — SIGNIFICANT CHANGE UP (ref 80–100)
NRBC # FLD: 0 — SIGNIFICANT CHANGE UP
PHOSPHATE SERPL-MCNC: 3.6 MG/DL — SIGNIFICANT CHANGE UP (ref 2.5–4.5)
PLATELET # BLD AUTO: 503 K/UL — HIGH (ref 150–400)
PMV BLD: SIGNIFICANT CHANGE UP FL (ref 7–13)
POTASSIUM SERPL-MCNC: 4.3 MMOL/L — SIGNIFICANT CHANGE UP (ref 3.5–5.3)
POTASSIUM SERPL-SCNC: 4.3 MMOL/L — SIGNIFICANT CHANGE UP (ref 3.5–5.3)
PROT SERPL-MCNC: 6.2 G/DL — SIGNIFICANT CHANGE UP (ref 6–8.3)
RBC # BLD: 4.73 M/UL — SIGNIFICANT CHANGE UP (ref 3.8–5.2)
RBC # FLD: SIGNIFICANT CHANGE UP % (ref 10.3–14.5)
SODIUM SERPL-SCNC: 139 MMOL/L — SIGNIFICANT CHANGE UP (ref 135–145)
WBC # BLD: 16.39 K/UL — HIGH (ref 3.8–10.5)
WBC # FLD AUTO: 16.39 K/UL — HIGH (ref 3.8–10.5)

## 2018-11-08 PROCEDURE — 71045 X-RAY EXAM CHEST 1 VIEW: CPT | Mod: 26

## 2018-11-08 PROCEDURE — 99232 SBSQ HOSP IP/OBS MODERATE 35: CPT

## 2018-11-08 RX ORDER — ACETAMINOPHEN 500 MG
2 TABLET ORAL
Qty: 40 | Refills: 0
Start: 2018-11-08 | End: 2018-11-12

## 2018-11-08 RX ORDER — HYDROMORPHONE HYDROCHLORIDE 2 MG/ML
0.5 INJECTION INTRAMUSCULAR; INTRAVENOUS; SUBCUTANEOUS ONCE
Qty: 0 | Refills: 0 | Status: DISCONTINUED | OUTPATIENT
Start: 2018-11-08 | End: 2018-11-08

## 2018-11-08 RX ORDER — TETRACAINE/BENZOCAINE/BUTAMBEN 2%-14%-2%
1 OINTMENT (GRAM) TOPICAL ONCE
Qty: 0 | Refills: 0 | Status: DISCONTINUED | OUTPATIENT
Start: 2018-11-08 | End: 2018-11-09

## 2018-11-08 RX ORDER — INSULIN LISPRO 100/ML
VIAL (ML) SUBCUTANEOUS
Qty: 0 | Refills: 0 | Status: DISCONTINUED | OUTPATIENT
Start: 2018-11-08 | End: 2018-11-09

## 2018-11-08 RX ORDER — BUDESONIDE, MICRONIZED 100 %
1 POWDER (GRAM) MISCELLANEOUS
Qty: 60 | Refills: 0 | OUTPATIENT
Start: 2018-11-08 | End: 2018-12-07

## 2018-11-08 RX ORDER — ACETAMINOPHEN 500 MG
2 TABLET ORAL
Qty: 40 | Refills: 0 | OUTPATIENT
Start: 2018-11-08 | End: 2018-11-12

## 2018-11-08 RX ADMIN — HYDROMORPHONE HYDROCHLORIDE 0.5 MILLIGRAM(S): 2 INJECTION INTRAMUSCULAR; INTRAVENOUS; SUBCUTANEOUS at 04:00

## 2018-11-08 RX ADMIN — HEPARIN SODIUM 5000 UNIT(S): 5000 INJECTION INTRAVENOUS; SUBCUTANEOUS at 05:51

## 2018-11-08 RX ADMIN — PANTOPRAZOLE SODIUM 40 MILLIGRAM(S): 20 TABLET, DELAYED RELEASE ORAL at 18:44

## 2018-11-08 RX ADMIN — PANTOPRAZOLE SODIUM 40 MILLIGRAM(S): 20 TABLET, DELAYED RELEASE ORAL at 05:51

## 2018-11-08 RX ADMIN — Medication 1: at 22:35

## 2018-11-08 RX ADMIN — Medication 102 MILLIGRAM(S): at 10:37

## 2018-11-08 RX ADMIN — CHLORHEXIDINE GLUCONATE 1 APPLICATION(S): 213 SOLUTION TOPICAL at 05:00

## 2018-11-08 RX ADMIN — Medication 0.5 MILLIGRAM(S): at 10:24

## 2018-11-08 RX ADMIN — Medication 1: at 12:02

## 2018-11-08 RX ADMIN — Medication 102 MILLIGRAM(S): at 03:12

## 2018-11-08 RX ADMIN — Medication 2: at 16:38

## 2018-11-08 RX ADMIN — HYDROMORPHONE HYDROCHLORIDE 0.5 MILLIGRAM(S): 2 INJECTION INTRAMUSCULAR; INTRAVENOUS; SUBCUTANEOUS at 03:45

## 2018-11-08 RX ADMIN — Medication 102 MILLIGRAM(S): at 20:14

## 2018-11-08 RX ADMIN — HEPARIN SODIUM 5000 UNIT(S): 5000 INJECTION INTRAVENOUS; SUBCUTANEOUS at 22:35

## 2018-11-08 RX ADMIN — HEPARIN SODIUM 5000 UNIT(S): 5000 INJECTION INTRAVENOUS; SUBCUTANEOUS at 14:58

## 2018-11-08 RX ADMIN — Medication 0.5 MILLIGRAM(S): at 20:33

## 2018-11-08 NOTE — DISCHARGE NOTE ADULT - CARE PROVIDER_API CALL
Anthony Hernandez (MD; PhD), Otolaryngology  [facility]  Dept of Otolaryngology  [address] [place] [id number]  Phone: [phone]  Fax: [phone]

## 2018-11-08 NOTE — DISCHARGE NOTE ADULT - CARE PROVIDERS DIRECT ADDRESSES
[email or web address]

## 2018-11-08 NOTE — PROGRESS NOTE ADULT - ASSESSMENT
ASSESSMENT: 71 y/o F PMH of subglottic stenosis receiving regular laser/dilation procedures presented to the [facility] ED with shortness of breath, worsening on exertion 11/6. Found to be stridorous but improved with racemic epinephrine and decadron. Transferred to SICU for airway monitoring. Tolerating room air. Planned for OR today with ENT.     PLAN:     Neurologic:   - prn pain control with tylenol    Pulmonary:  - continuous O2 sat monitoring  - q8h decardon, Budesonide q12h    Cardiovascular:  - no acute issues    Gastrointestinal:   - NPO after midnight  - Protonix BID as per ENT    Renal:   - trend BUN/Cr  - replete electrolytes PRN    Hematologic:   -subcutaneous heparin    Infectious Disease:   - afebrile    Endocrine:   -on decadron q8h    Critical Care Diagnoses: Subglottic stenosis, critical airway. ASSESSMENT: 71 y/o F PMH of subglottic stenosis receiving regular laser/dilation procedures presented to the [facility] ED with shortness of breath, worsening on exertion 11/6. Found to be stridorous but improved with racemic epinephrine and decadron. Transferred to SICU for airway monitoring. Tolerating room air. Planned for OR today with ENT.     PLAN:     Neurologic:   - prn pain control with tylenol    Pulmonary:  - continuous O2 sat monitoring  - q8h decadron 10mg, Budesonide q12h    Cardiovascular:  - no acute issues    Gastrointestinal:   - NPO after midnight  - Protonix BID as per ENT    Renal:   - trend BUN/Cr  - replete electrolytes PRN    Hematologic:   -subcutaneous heparin    Infectious Disease:   - afebrile    Endocrine:   -on decadron q8h    Critical Care Diagnoses: Subglottic stenosis, critical airway.

## 2018-11-08 NOTE — DISCHARGE NOTE ADULT - CARE PLAN
Principal Discharge DX:	Stridor  Goal:	balloon dilation of Subglottic stenosis  Assessment and plan of treatment:	same

## 2018-11-08 NOTE — PROGRESS NOTE ADULT - SUBJECTIVE AND OBJECTIVE BOX
POST ANESTHESIA EVALUATION    62y Female POSTOP DAY 1 S/P laryngoscopy, bronchosopy    MENTAL STATUS: Patient participation [x  ] Awake     [  ] Arousable     [  ] Sedated    AIRWAY PATENCY: [ x ] Satisfactory  [  ] Other:     Vital Signs Last 24 Hrs  T(C): 36.3 (08 Nov 2018 08:00), Max: 37 (07 Nov 2018 11:06)  T(F): 97.3 (08 Nov 2018 08:00), Max: 98.6 (07 Nov 2018 11:06)  HR: 72 (08 Nov 2018 08:00) (47 - 98)  BP: 143/67 (08 Nov 2018 08:00) (101/60 - 162/78)  BP(mean): 86 (08 Nov 2018 08:00) (64 - 107)  RR: 21 (08 Nov 2018 08:00) (9 - 22)  SpO2: 95% (08 Nov 2018 08:00) (95% - 100%)  I&O's Summary    07 Nov 2018 07:01  -  08 Nov 2018 07:00  --------------------------------------------------------  IN: 1700 mL / OUT: 700 mL / NET: 1000 mL          NAUSEA/ VOMITTING:  [ x ] NONE  [  ] CONTROLLED [  ] OTHER     PAIN: [ x ] CONTROLLED WITH CURRENT REGIMEN  [  ] OTHER    [ x ] NO APPARENT ANESTHESIA COMPLICATIONS      Comments:

## 2018-11-08 NOTE — DISCHARGE NOTE ADULT - MEDICATION SUMMARY - MEDICATIONS TO TAKE
I will START or STAY ON the medications listed below when I get home from the hospital:    budesonide 0.5 mg/2 mL inhalation suspension  -- 1 application inhaled 2 times a day   -- Indication: For asthma    acetaminophen 325 mg oral tablet  -- 2 tab(s) by mouth every 6 hours, As needed, mild pain MDD:8  -- Indication: For pain

## 2018-11-08 NOTE — PROGRESS NOTE ADULT - SUBJECTIVE AND OBJECTIVE BOX
HISTORY  62y Female with a history of subglottic stenosis for 10 years with no other significant PMH presented to the [facility] ED for SOB on 11/6. Patient has been followed by Dr. Cartagena for laser surgery (ablation) once a year, pt does well after surgery, does not need home O2, only occasional humidified air. Starting 1 month ago pt started to become short of breath along with a dry cough; several days ago shortness of breath became acutely worse. Pt realized she couldn't walk to the bathroom without become SOB so came to hospital. Found to have biphasic stridor in the ED but saturating well on room air. Transferred to the SICU for airway monitoring.   24 HOUR EVENTS:  OR for Dilation of Subglottic Stenosis.     SUBJECTIVE/ROS:  [X] A ten-point review of systems was otherwise negative except as noted.  [ ] Due to altered mental status/intubation, subjective information were not able to be obtained from the patient. History was obtained, to the extent possible, from review of the chart and collateral sources of information.      NEURO  RASS: 0 GCS: 15  Exam: Normal, NAD, alert, oriented x 3, no focal deficits.    Meds: acetaminophen   Tablet .. 650 milliGRAM(s) Oral every 6 hours PRN Mild Pain (1 - 3)    [x] Adequacy of sedation and pain control has been assessed and adjusted      RESPIRATORY  RR: 17 (11-07-18 @ 00:00) (11 - 19)  SpO2: 97% (11-07-18 @ 00:00) (96% - 100%)  Wt(kg): --  Exam: Loud biphasic stridor, inspiratory worse than expiratory. Supraclavicular retractions. Able to speak in short sentences. Occasional coughing.    Meds: racepinephrine  2.25% Inhalation 0.5 milliLiter(s) Inhalation every 3 hours PRN shortness of breath/stridor        CARDIOVASCULAR  HR: 73 (11-07-18 @ 00:00) (73 - 99)  BP: 145/72 (11-07-18 @ 00:00) (126/74 - 190/82)  BP(mean): 92 (11-07-18 @ 00:00) (80 - 110)  ABP: --  ABP(mean): --  Wt(kg): --  CVP(cm H2O): --      Exam: S1, S2.  Regular rate and rhythm. No Peripheral edema    Cardiac Rhythm: Normal Sinus Rhythm     Perfusion     [X]Adequate   [ ]Inadequate  Mentation   [X]Normal       [ ]Reduced  Extremities  [X]Warm         [ ]Cool  Volume Status [ ]Hypervolemic [X]Euvolemic [ ]Hypovolemic  Meds:         GI/NUTRITION  Exam: Abdomen soft, Non-tender, Non-distended.    Diet: NPO  Meds: famotidine  IVPB 20 milliGRAM(s) IV Intermittent two times a day      GENITOURINARY  I&O's Detail    11-06 @ 07:01  -  11-07 @ 00:17  --------------------------------------------------------  IN:    IV PiggyBack: 100 mL  Total IN: 100 mL    OUT:    Voided: 625 mL  Total OUT: 625 mL    Total NET: -525 mL      11-07    141  |  97<L>  |  13  ----------------------------<  147<H>  3.9   |  29  |  0.66    Ca    9.7      07 Nov 2018 02:35  Phos  4.0     11-07  Mg     2.2     11-07    TPro  7.9  /  Alb  4.6  /  TBili  0.9  /  DBili  x   /  AST  58<H>  /  ALT  96<H>  /  AlkPhos  126<H>  11-06      [ ] Alarcon catheter, indication: N/A  Meds: lactated ringers. 1000 milliLiter(s) IV Continuous <Continuous>        HEMATOLOGIC  Meds: heparin  Injectable 5000 Unit(s) SubCutaneous every 8 hours    [x] VTE Prophylaxis                          14.2   9.72  )-----------( 512      ( 07 Nov 2018 02:35 )             42.2     PT/INR - ( 06 Nov 2018 10:10 )   PT: 12.3 SEC;   INR: 1.08     PTT - ( 06 Nov 2018 10:10 )  PTT:36.7 SEC    Transfusion     [ ] PRBC   [ ] Platelets   [ ] FFP   [ ] Cryoprecipitate      INFECTIOUS DISEASES  T(C): 36.7 (11-07-18 @ 00:00), Max: 36.7 (11-06-18 @ 09:46)  Wt(kg): --  WBC Count:  9.72 K/uL (11-07)    Recent Cultures:    Meds: influenza   Vaccine 0.5 milliLiter(s) IntraMuscular once        ENDOCRINE  CAPILLARY BLOOD GLUCOSE      Meds: dexamethasone  IVPB 10 milliGRAM(s) IV Intermittent every 8 hours  	    ACCESS DEVICES:  [X] Peripheral IV  [ ] Central Venous Line	[ ] R	[ ] L	[ ] IJ	[ ] Fem	[ ] SC	Placed:   [ ] Arterial Line		[ ] R	[ ] L	[ ] Fem	[ ] Rad	[ ] Ax	Placed:   [ ] PICC:					[ ] Mediport  [ ] Urinary Catheter, Date Placed:   [ ] Necessity of urinary, arterial, and venous catheters discussed HISTORY  62y Female with a history of subglottic stenosis for 10 years with no other significant PMH presented to the [facility] ED for SOB on 11/6. Patient has been followed by Dr. Cartagena for laser surgery (ablation) once a year, pt does well after surgery, does not need home O2, only occasional humidified air. Starting 1 month ago pt started to become short of breath along with a dry cough; several days ago shortness of breath became acutely worse. Pt realized she couldn't walk to the bathroom without become SOB so came to hospital. Found to have biphasic stridor in the ED but saturating well on room air. Transferred to the SICU for airway monitoring.   24 HOUR EVENTS:  OR for Dilation of Subglottic Stenosis. No post-operative airway issues overnight.     SUBJECTIVE/ROS:  [X] A ten-point review of systems was otherwise negative except as noted.  [ ] Due to altered mental status/intubation, subjective information were not able to be obtained from the patient. History was obtained, to the extent possible, from review of the chart and collateral sources of information.      NEURO  RASS: 0 GCS: 15  Exam: Normal, NAD, alert, oriented x 3, no focal deficits.    Meds: acetaminophen   Tablet .. 650 milliGRAM(s) Oral every 6 hours PRN Mild Pain (1 - 3)    [x] Adequacy of sedation and pain control has been assessed and adjusted      RESPIRATORY  RR: 17 (11-07-18 @ 00:00) (11 - 19)  SpO2: 97% (11-07-18 @ 00:00) (96% - 100%)  Wt(kg): --  Exam: Loud biphasic stridor, inspiratory worse than expiratory. Supraclavicular retractions. Able to speak in short sentences. Occasional coughing.    Meds: racepinephrine  2.25% Inhalation 0.5 milliLiter(s) Inhalation every 3 hours PRN shortness of breath/stridor        CARDIOVASCULAR  HR: 73 (11-07-18 @ 00:00) (73 - 99)  BP: 145/72 (11-07-18 @ 00:00) (126/74 - 190/82)  BP(mean): 92 (11-07-18 @ 00:00) (80 - 110)  ABP: --  ABP(mean): --  Wt(kg): --  CVP(cm H2O): --      Exam: S1, S2.  Regular rate and rhythm. No Peripheral edema    Cardiac Rhythm: Normal Sinus Rhythm     Perfusion     [X]Adequate   [ ]Inadequate  Mentation   [X]Normal       [ ]Reduced  Extremities  [X]Warm         [ ]Cool  Volume Status [ ]Hypervolemic [X]Euvolemic [ ]Hypovolemic  Meds:         GI/NUTRITION  Exam: Abdomen soft, Non-tender, Non-distended.    Diet: NPO  Meds: famotidine  IVPB 20 milliGRAM(s) IV Intermittent two times a day      GENITOURINARY  I&O's Detail    11-06 @ 07:01  -  11-07 @ 00:17  --------------------------------------------------------  IN:    IV PiggyBack: 100 mL  Total IN: 100 mL    OUT:    Voided: 625 mL  Total OUT: 625 mL    Total NET: -525 mL    11-08    139  |  100  |  19  ----------------------------<  158<H>  4.3   |  27  |  0.68    Ca    9.4      08 Nov 2018 03:20  Phos  3.6     11-08  Mg     2.3     11-08    TPro  6.2  /  Alb  3.6  /  TBili  0.4  /  DBili  x   /  AST  24  /  ALT  65<H>  /  AlkPhos  100  11-08      [ ] Alarcon catheter, indication: N/A  Meds: lactated ringers. 1000 milliLiter(s) IV Continuous <Continuous>        HEMATOLOGIC  Meds: heparin  Injectable 5000 Unit(s) SubCutaneous every 8 hours    [x] VTE Prophylaxis                          13.1   16.39 )-----------( 503      ( 08 Nov 2018 03:20 )             38.9     PT/INR - ( 07 Nov 2018 08:25 )   PT: 12.3 SEC;   INR: 1.08     PTT - ( 07 Nov 2018 08:25 )  PTT:25.4 SEC    Transfusion     [ ] PRBC   [ ] Platelets   [ ] FFP   [ ] Cryoprecipitate      INFECTIOUS DISEASES  T(C): 36.7 (11-07-18 @ 00:00), Max: 36.7 (11-06-18 @ 09:46)  Wt(kg): --  WBC Count:  16.39 K/uL (11-08)    Recent Cultures:    Meds: influenza   Vaccine 0.5 milliLiter(s) IntraMuscular once        ENDOCRINE  CAPILLARY BLOOD GLUCOSE      Meds: dexamethasone  IVPB 10 milliGRAM(s) IV Intermittent every 8 hours  	    ACCESS DEVICES:  [X] Peripheral IV  [ ] Central Venous Line	[ ] R	[ ] L	[ ] IJ	[ ] Fem	[ ] SC	Placed:   [ ] Arterial Line		[ ] R	[ ] L	[ ] Fem	[ ] Rad	[ ] Ax	Placed:   [ ] PICC:					[ ] Mediport  [ ] Urinary Catheter, Date Placed:   [ ] Necessity of urinary, arterial, and venous catheters discussed

## 2018-11-08 NOTE — PROGRESS NOTE ADULT - SUBJECTIVE AND OBJECTIVE BOX
Patient seen and examined at the bedside. Taken to OR yesterday for dilation of subglottic stenosis. Did well overnight. Breathing on face tent this morning.    AVSS  NAD, alert, awake  Breathing comfortably on face tent     Neck: soft, flat, trachea midline    A/P  62F with subglottic stenosis and L TVC hypomobility presenting with worsening stridor and SOB currently stable on RA, now s/p balloon dilation of subglottic stenosis.   -decadron 10mg q8  -budesonide nebs  -protonix 40 bid  -regular diet  -HOB elevated > 45 degrees  -call/page with questions  -dispo pending supplies: needs nebulizer at home for budesonide neb treatments

## 2018-11-08 NOTE — DISCHARGE NOTE ADULT - PATIENT PORTAL LINK FT
You can access the [facility] Portal, offered by [facility], by registering with the following website: [email or web address]

## 2018-11-09 VITALS
HEART RATE: 68 BPM | OXYGEN SATURATION: 100 % | SYSTOLIC BLOOD PRESSURE: 135 MMHG | DIASTOLIC BLOOD PRESSURE: 78 MMHG | TEMPERATURE: 97 F | RESPIRATION RATE: 18 BRPM

## 2018-11-09 LAB
BUN SERPL-MCNC: 22 MG/DL — SIGNIFICANT CHANGE UP (ref 7–23)
CALCIUM SERPL-MCNC: 9.1 MG/DL — SIGNIFICANT CHANGE UP (ref 8.4–10.5)
CHLORIDE SERPL-SCNC: 100 MMOL/L — SIGNIFICANT CHANGE UP (ref 98–107)
CO2 SERPL-SCNC: 27 MMOL/L — SIGNIFICANT CHANGE UP (ref 22–31)
CREAT SERPL-MCNC: 0.69 MG/DL — SIGNIFICANT CHANGE UP (ref 0.5–1.3)
GLUCOSE SERPL-MCNC: 165 MG/DL — HIGH (ref 70–99)
HCT VFR BLD CALC: 39 % — SIGNIFICANT CHANGE UP (ref 34.5–45)
HGB BLD-MCNC: 12.7 G/DL — SIGNIFICANT CHANGE UP (ref 11.5–15.5)
MAGNESIUM SERPL-MCNC: 2.4 MG/DL — SIGNIFICANT CHANGE UP (ref 1.6–2.6)
MCHC RBC-ENTMCNC: 27.2 PG — SIGNIFICANT CHANGE UP (ref 27–34)
MCHC RBC-ENTMCNC: 32.6 % — SIGNIFICANT CHANGE UP (ref 32–36)
MCV RBC AUTO: 83.5 FL — SIGNIFICANT CHANGE UP (ref 80–100)
NRBC # FLD: 0 — SIGNIFICANT CHANGE UP
PHOSPHATE SERPL-MCNC: 2.5 MG/DL — SIGNIFICANT CHANGE UP (ref 2.5–4.5)
PLATELET # BLD AUTO: 390 K/UL — SIGNIFICANT CHANGE UP (ref 150–400)
PMV BLD: 9.6 FL — SIGNIFICANT CHANGE UP (ref 7–13)
POTASSIUM SERPL-MCNC: 4.4 MMOL/L — SIGNIFICANT CHANGE UP (ref 3.5–5.3)
POTASSIUM SERPL-SCNC: 4.4 MMOL/L — SIGNIFICANT CHANGE UP (ref 3.5–5.3)
RBC # BLD: 4.67 M/UL — SIGNIFICANT CHANGE UP (ref 3.8–5.2)
RBC # FLD: 14.6 % — HIGH (ref 10.3–14.5)
SODIUM SERPL-SCNC: 137 MMOL/L — SIGNIFICANT CHANGE UP (ref 135–145)
WBC # BLD: 11.96 K/UL — HIGH (ref 3.8–10.5)
WBC # FLD AUTO: 11.96 K/UL — HIGH (ref 3.8–10.5)

## 2018-11-09 PROCEDURE — 71045 X-RAY EXAM CHEST 1 VIEW: CPT | Mod: 26

## 2018-11-09 PROCEDURE — 99232 SBSQ HOSP IP/OBS MODERATE 35: CPT

## 2018-11-09 RX ORDER — BUDESONIDE, MICRONIZED 100 %
1 POWDER (GRAM) MISCELLANEOUS
Qty: 60 | Refills: 0
Start: 2018-11-09 | End: 2018-12-08

## 2018-11-09 RX ADMIN — Medication 102 MILLIGRAM(S): at 11:07

## 2018-11-09 RX ADMIN — HEPARIN SODIUM 5000 UNIT(S): 5000 INJECTION INTRAVENOUS; SUBCUTANEOUS at 06:22

## 2018-11-09 RX ADMIN — Medication 1: at 06:21

## 2018-11-09 RX ADMIN — HEPARIN SODIUM 5000 UNIT(S): 5000 INJECTION INTRAVENOUS; SUBCUTANEOUS at 14:22

## 2018-11-09 RX ADMIN — Medication 2: at 11:12

## 2018-11-09 RX ADMIN — Medication 0.5 MILLIGRAM(S): at 08:51

## 2018-11-09 RX ADMIN — Medication 102 MILLIGRAM(S): at 03:45

## 2018-11-09 RX ADMIN — PANTOPRAZOLE SODIUM 40 MILLIGRAM(S): 20 TABLET, DELAYED RELEASE ORAL at 06:21

## 2018-11-09 RX ADMIN — INFLUENZA VIRUS VACCINE 0.5 MILLILITER(S): 15; 15; 15; 15 SUSPENSION INTRAMUSCULAR at 13:08

## 2018-11-09 NOTE — PROGRESS NOTE ADULT - ASSESSMENT
ASSESSMENT: 71 y/o F PMH of subglottic stenosis receiving regular laser/dilation procedures presented to the [facility] ED with shortness of breath, worsening on exertion 11/6. Found to be stridorous but improved with racemic epinephrine and decadron. Transferred to SICU for airway monitoring. Tolerating room air. Planned for OR today with ENT.     PLAN:     Neurologic:   - prn pain control with tylenol    Pulmonary:  - continuous O2 sat monitoring  - q8h decadron 10mg, Budesonide q12h    Cardiovascular:  - no acute issues    Gastrointestinal:   - Protonix BID as per ENT    Renal:   - trend BUN/Cr  - replete electrolytes PRN    Hematologic:   -subcutaneous heparin    Infectious Disease:   - afebrile    Endocrine:   - c/w decadron q8h; f/u with ENT regarding tapering  - Monitor glucose on BMP    Critical Care Diagnoses: Subglottic stenosis, critical airway.

## 2018-11-09 NOTE — PROGRESS NOTE ADULT - SUBJECTIVE AND OBJECTIVE BOX
SICU DAILY PROGRESS NOTE    INTERVAL/OVERNIGHT EVENTS:    s/p balloon dilation of Subglottic Stenosis 11/7. No post-operative airway issues overnight.   - case management/insurance issues postponed planned discharge yesterday    HISTORY  62y Female with a history of subglottic stenosis for 10 years with no other significant PMH presented to the [facility] ED for SOB on 11/6. Patient has been followed by Dr. Cartagena for laser surgery (ablation) once a year, pt does well after surgery, does not need home O2, only occasional humidified air. Starting 1 month ago pt started to become short of breath along with a dry cough; several days ago shortness of breath became acutely worse. Pt realized she couldn't walk to the bathroom without become SOB so came to hospital. Found to have biphasic stridor in the ED but saturating well on room air. Transferred to the SICU for airway monitoring.     SUBJECTIVE/ROS:  [X] A ten-point review of systems was otherwise negative except as noted.  [ ] Due to altered mental status/intubation, subjective information were not able to be obtained from the patient. History was obtained, to the extent possible, from review of the chart and collateral sources of information.    MEDICATIONS  MEDICATIONS  (STANDING):  buDESOnide   0.5 milliGRAM(s) Respule 0.5 milliGRAM(s) Inhalation two times a day  chlorhexidine 4% Liquid 1 Application(s) Topical <User Schedule>  dexamethasone  IVPB 10 milliGRAM(s) IV Intermittent every 8 hours  heparin  Injectable 5000 Unit(s) SubCutaneous every 8 hours  influenza   Vaccine 0.5 milliLiter(s) IntraMuscular once  insulin lispro (HumaLOG) corrective regimen sliding scale   SubCutaneous Before meals and at bedtime  pantoprazole  Injectable 40 milliGRAM(s) IV Push two times a day  tetracaine/benzocaine/butamben Spray 1 Spray(s) Topical once    MEDICATIONS  (PRN):  acetaminophen   Tablet .. 650 milliGRAM(s) Oral every 6 hours PRN Mild Pain (1 - 3)    VITALS/I&Os  T(C): 36.3 (11-09-18 @ 00:00), Max: 36.5 (11-08-18 @ 16:00)  HR: 49 (11-09-18 @ 00:00) (45 - 72)  BP: 134/59 (11-09-18 @ 00:00) (90/73 - 143/67)  ABP: --  ABP(mean): --  RR: 13 (11-09-18 @ 00:00) (9 - 22)  SpO2: 99% (11-09-18 @ 00:00) (95% - 100%)  Wt(kg): --  CVP(mm Hg): --      11-07 @ 07:01  -  11-08 @ 07:00  --------------------------------------------------------  IN:    IV PiggyBack: 200 mL    lactated ringers.: 1500 mL  Total IN: 1700 mL    OUT:    Voided: 700 mL  Total OUT: 700 mL    Total NET: 1000 mL      11-08 @ 07:01  -  11-09 @ 03:04  --------------------------------------------------------  IN:    IV PiggyBack: 220 mL    lactated ringers.: 182 mL    Oral Fluid: 350 mL  Total IN: 752 mL    OUT:    Voided: 1100 mL  Total OUT: 1100 mL    Total NET: -348 mL      EXAM  NEURO  RASS: 0 GCS: 15  Exam: Normal, NAD, alert, oriented x 3, no focal deficits.  [x] Adequacy of sedation and pain control has been assessed and adjusted      RESPIRATORY  Exam: Loud biphasic stridor improving, inspiratory worse than expiratory. Supraclavicular retractions. Able to speak in short sentences. Occasional coughing.      CARDIOVASCULAR  Exam: S1, S2.  Regular rate and rhythm. No Peripheral edema    Cardiac Rhythm: Normal Sinus Rhythm     Perfusion     [X]Adequate   [ ]Inadequate  Mentation   [X]Normal       [ ]Reduced  Extremities  [X]Warm         [ ]Cool  Volume Status [ ]Hypervolemic [X]Euvolemic [ ]Hypovolemic    GI/NUTRITION  Exam: Abdomen soft, Non-tender, Non-distended.    Diet: NPO      ACCESS DEVICES:  [X] Peripheral IV  [ ] Central Venous Line	[ ] R	[ ] L	[ ] IJ	[ ] Fem	[ ] SC	Placed:   [ ] Arterial Line		[ ] R	[ ] L	[ ] Fem	[ ] Rad	[ ] Ax	Placed:   [ ] PICC:					[ ] Mediwilfredo  [ ] Urinary Catheter, Date Placed:   [ ] Necessity of urinary, arterial, and venous catheters discussed

## 2018-11-09 NOTE — PROGRESS NOTE ADULT - SUBJECTIVE AND OBJECTIVE BOX
Patient seen and examined at the bedside. No acute events overnight. Breathing on face tent this morning comfortably     AVSS  NAD, alert, awake  Breathing comfortably on face tent    Good voice, mild hoarseness which is chronic    Neck: soft, flat, trachea midline    A/P  62F with subglottic stenosis and L TVC hypomobility presenting with worsening stridor and SOB currently stable on RA, now s/p balloon dilation of subglottic stenosis.   -taper steroids   -budesonide nebs  -protonix 40 bid  -regular diet  -HOB elevated > 45 degrees  -call/page with questions  -dispo pending supplies: needs nebulizer at home for budesonide neb treatments  -dc today

## 2018-11-28 ENCOUNTER — APPOINTMENT (OUTPATIENT)
Dept: OTOLARYNGOLOGY | Facility: CLINIC | Age: 63
End: 2018-11-28
Payer: MEDICARE

## 2018-11-28 PROCEDURE — 99214 OFFICE O/P EST MOD 30 MIN: CPT | Mod: 25

## 2018-11-28 PROCEDURE — 31579 LARYNGOSCOPY TELESCOPIC: CPT

## 2018-12-20 NOTE — DISCHARGE NOTE ADULT - FUNCTIONAL SCREEN CURRENT LEVEL: BATHING, MLM
0 = independent recovery from Infection -Pt will continue antibiotics for ____ days as per the Infectious disease specialist  -Pt should continue scrotal support and precautions.  -Call office if you have fever >101,difficulty urinating ,Nausea, Vomiting, Inability to eat.  -Follow up with Dr. Tucker in 1 week (12/27) for follow up on your progress and to discuss surgical removal of   your testicle. 2 = assistive person

## 2019-01-01 NOTE — PROGRESS NOTE ADULT - SUBJECTIVE AND OBJECTIVE BOX
Patient seen and examined at the bedside.    Received 1 dose racemic epi overnight for stridor, otherwise has remained stable on RA.    T(C): 36.2 (11-07-18 @ 08:00), Max: 36.7 (11-06-18 @ 09:46)  HR: 82 (11-07-18 @ 09:00) (60 - 99)  BP: 130/59 (11-07-18 @ 09:00) (126/74 - 190/82)  RR: 16 (11-07-18 @ 09:00) (9 - 19)  SpO2: 99% (11-07-18 @ 09:00) (96% - 100%)    NAD, alert, awake  Breathing comfortably on RA but with significant stridor, no retractions     Neck: soft, flat, trachea midline    A/P  62F with subglottic stenosis and L TVC hypomobility presenting with worsening stridor and SOB currently stable on RA.  -decadron 10mg q8  -pepcid  -NPO/IVF for OR this afternoon  -consented, consent placed in chart  -preop labs done  -rac epi/heliox as needed for worsening respiratory status  -HOB elevated > 45 degrees  -call/page with questions FEMALE ANITA ESPINOSA;      GA 34 weeks;     Age: 5 d;   PMA: _____      Current Status: 34 weeker feeding and growing     Weight: 1814 grams  (+15 )     Intake(ml/kg/day):  148  Urine output:  x8  (ml/kg/hr or frequency):                                  Stools (frequency): x4  Other:     *******************************************************    FEN: Feeding EHM/Neosure ad tasha, (20-45) based on cues. Enable breastfeeding. Tripple feeding pattern.    Respiratory: Comfortable in RA. Having desats and apnea. last episode 5/11 with stim  CV: No current issues. Continue cardiorespiratory monitoring.  Heme: At risk for hyperbilirubinemia due to prematurityl, below threshold so far,   ID: cbc benign at 6 hours, blood culture negative.  Neuro: Normal exam for GA. Head US 5/10 nl  Thermal: Stable in open crib since 5/7  Social: Father updated at bedside 5/10    Labs/Imaging/Studies: bili 5/12 FEMALE ANITA ESPINOSA;      GA 34 weeks;     Age: 5 d;   PMA: 34    Current Status: 34 weeker feeding and growing     Weight: 1819 + 5  Intake(ml/kg/day):  161  Urine output:  x 8  (ml/kg/hr or frequency):                                  Stools (frequency): x 3  Other:     *******************************************************    FEN: Feeding EHM/Neosure ad tasha, (35 - 40) based on cues. Enable breastfeeding. Triple feeding pattern.    Respiratory: Comfortable in RA. Having desats and apnea. last episode 5/11 with stim  CV: No current issues. Continue cardiorespiratory monitoring.  Heme: At risk for hyperbilirubinemia due to prematurity  ID: cbc benign at 6 hours, blood culture negative.  Neuro: Normal exam for GA. Head US 5/10 nl. 5/10 - NRE = 5, No EI, F/U 6 months  Thermal: Stable in open crib since 5/7  Social: Father updated at bedside 5/10    Labs/Imaging/Studies:

## 2019-01-10 ENCOUNTER — APPOINTMENT (OUTPATIENT)
Dept: OTOLARYNGOLOGY | Facility: CLINIC | Age: 64
End: 2019-01-10
Payer: MEDICARE

## 2019-01-10 PROCEDURE — 31579 LARYNGOSCOPY TELESCOPIC: CPT

## 2019-01-10 PROCEDURE — 99214 OFFICE O/P EST MOD 30 MIN: CPT | Mod: 25

## 2019-01-10 NOTE — PHYSICAL EXAM
[Midline] : trachea located in midline position [Laryngoscopy Performed] : laryngoscopy was performed, see procedure section for findings [Normal] : no rashes [de-identified] : severely raspy and breathy voice, no appreciable stridor

## 2019-01-10 NOTE — REASON FOR VISIT
[Subsequent Evaluation] : a subsequent evaluation for [Hoarseness/Dysphonia] : hoarseness [FreeTextEntry2] : f/u LTS

## 2019-01-10 NOTE — HISTORY OF PRESENT ILLNESS
[de-identified] : 62yo F s/p LTS excision, breathing very well, still very dysphonic, stable from pre-op. No longer using nebs ran out 1 week ago. Taking reflux meds. Doing nystatin swish and swallow occasionally. No dysphagia, no choking/ aspiration. Able to walk well now and still feels improved patent. \par \par \par

## 2019-01-10 NOTE — CONSULT LETTER
[Dear  ___] : Dear  [unfilled], [Consult Letter:] : I had the pleasure of evaluating your patient, [unfilled]. [Please see my note below.] : Please see my note below. [Consult Closing:] : Thank you very much for allowing me to participate in the care of this patient.  If you have any questions, please do not hesitate to contact me. [Sincerely,] : Sincerely, [FreeTextEntry3] : Anthony Hernandez MD, PhD\par Chief, Division of Laryngology\par Department of Otolaryngology\par [facility]\par Pediatric Otolaryngology, [facility]\par  of Otolaryngology\par [facility] [employer] [facility]\par \par \par

## 2019-03-14 ENCOUNTER — APPOINTMENT (OUTPATIENT)
Dept: OTOLARYNGOLOGY | Facility: CLINIC | Age: 64
End: 2019-03-14
Payer: MEDICARE

## 2019-03-14 VITALS — SYSTOLIC BLOOD PRESSURE: 140 MMHG | DIASTOLIC BLOOD PRESSURE: 84 MMHG

## 2019-03-14 PROCEDURE — 31579 LARYNGOSCOPY TELESCOPIC: CPT

## 2019-03-14 PROCEDURE — 99214 OFFICE O/P EST MOD 30 MIN: CPT | Mod: 25

## 2019-03-14 NOTE — CONSULT LETTER
[Dear  ___] : Dear  [unfilled], [Consult Letter:] : I had the pleasure of evaluating your patient, [unfilled]. [Please see my note below.] : Please see my note below. [Consult Closing:] : Thank you very much for allowing me to participate in the care of this patient.  If you have any questions, please do not hesitate to contact me. [Sincerely,] : Sincerely, [FreeTextEntry3] : Anthony Hernandez MD, PhD\par Chief, Division of Laryngology\par Department of Otolaryngology\par [facility]\par Pediatric Otolaryngology, [facility]\par  of Otolaryngology\par [facility] at [facility]\par \par \par

## 2019-03-14 NOTE — PHYSICAL EXAM
[Midline] : trachea located in midline position [Laryngoscopy Performed] : laryngoscopy was performed, see procedure section for findings [Normal] : no rashes [de-identified] : moderately raspy and breathy voice, no appreciable stridor

## 2019-03-14 NOTE — HISTORY OF PRESENT ILLNESS
[de-identified] : 62yo F s/p LTS excision, breathing very well, still very dysphonic, stable from pre-op. Not using medicine bc it was not approved by the insurance. Taking reflux meds. Doing nystatin swish and swallow occasionally. No dysphagia, no choking/ aspiration. Able to walk well now and still feels improved patent. Breathing well. Not using any nebs.\par \par \par \par

## 2019-05-16 ENCOUNTER — APPOINTMENT (OUTPATIENT)
Dept: OTOLARYNGOLOGY | Facility: CLINIC | Age: 64
End: 2019-05-16
Payer: MEDICARE

## 2019-05-16 VITALS — SYSTOLIC BLOOD PRESSURE: 168 MMHG | DIASTOLIC BLOOD PRESSURE: 84 MMHG

## 2019-05-16 PROCEDURE — 31579 LARYNGOSCOPY TELESCOPIC: CPT

## 2019-05-16 PROCEDURE — 99214 OFFICE O/P EST MOD 30 MIN: CPT | Mod: 25

## 2019-05-16 NOTE — CONSULT LETTER
[Dear  ___] : Dear  [unfilled], [Consult Letter:] : I had the pleasure of evaluating your patient, [unfilled]. [Please see my note below.] : Please see my note below. [Consult Closing:] : Thank you very much for allowing me to participate in the care of this patient.  If you have any questions, please do not hesitate to contact me. [Sincerely,] : Sincerely, [FreeTextEntry3] : Anthony Hernandez MD, PhD\par Chief, Division of Laryngology\par Department of Otolaryngology\par [facility]\par Pediatric Otolaryngology, [facility]\par  of Otolaryngology\par [facility] at [facility]\par \par \par

## 2019-05-16 NOTE — HISTORY OF PRESENT ILLNESS
[de-identified] : 64yo F s/p LTS excision 11/2018, breathing very well, still very dysphonic, stable. Voice does not bother her. Using budesonide QD AM and saline nightly. Taking omeprazole QD. Doing nystatin swish and swallow occasionally. No dysphagia, no choking/ aspiration.. Breathing well. No problems with eating and drinking. \par \par \par \par \par

## 2019-05-16 NOTE — PHYSICAL EXAM
[Midline] : trachea located in midline position [Laryngoscopy Performed] : laryngoscopy was performed, see procedure section for findings [Normal] : orientation to person, place, and time: normal [de-identified] : small old stable red hamartoma on TM [de-identified] : moderately raspy and breathy voice, no appreciable stridor

## 2019-08-29 ENCOUNTER — APPOINTMENT (OUTPATIENT)
Dept: OTOLARYNGOLOGY | Facility: CLINIC | Age: 64
End: 2019-08-29
Payer: MEDICARE

## 2019-08-29 VITALS — DIASTOLIC BLOOD PRESSURE: 90 MMHG | HEART RATE: 80 BPM | SYSTOLIC BLOOD PRESSURE: 153 MMHG

## 2019-08-29 PROCEDURE — 99214 OFFICE O/P EST MOD 30 MIN: CPT | Mod: 25

## 2019-08-29 PROCEDURE — 31579 LARYNGOSCOPY TELESCOPIC: CPT

## 2019-08-29 NOTE — CONSULT LETTER
[Dear  ___] : Dear  [unfilled], [Consult Letter:] : I had the pleasure of evaluating your patient, [unfilled]. [Please see my note below.] : Please see my note below. [Consult Closing:] : Thank you very much for allowing me to participate in the care of this patient.  If you have any questions, please do not hesitate to contact me. [Sincerely,] : Sincerely, [FreeTextEntry3] : Anthony Hernandez MD, PhD\par Chief, Division of Laryngology\par Department of Otolaryngology\par [facility]\par Pediatric Otolaryngology, [facility]\par  of Otolaryngology\par [facility] at [facility]\par \par \par

## 2019-08-29 NOTE — HISTORY OF PRESENT ILLNESS
[de-identified] : 62yo F s/p LTS excision 11/2018. Had an episode few weeks ago with SOB and thinks it was due to allergies. It got better now breathing very well. Still very dysphonic, stable. Voice does not bother her. Using budesonide QD AM and saline BID.  Taking omeprazole QD. No dysphagia, no choking/ aspiration.. Breathing well. No problems with eating and drinking. Not using flonase. Did not like how she feels on it. \par \par \par \par \par

## 2019-08-29 NOTE — PHYSICAL EXAM
[Midline] : trachea located in midline position [Laryngoscopy Performed] : laryngoscopy was performed, see procedure section for findings [Normal] : no rashes [de-identified] : small old stable red hamartoma on TM [de-identified] : moderately raspy and breathy voice, mild biphasic stridor

## 2019-09-19 ENCOUNTER — APPOINTMENT (OUTPATIENT)
Dept: OTOLARYNGOLOGY | Facility: CLINIC | Age: 64
End: 2019-09-19

## 2019-10-24 ENCOUNTER — APPOINTMENT (OUTPATIENT)
Dept: OTOLARYNGOLOGY | Facility: CLINIC | Age: 64
End: 2019-10-24
Payer: MEDICARE

## 2019-10-24 VITALS
BODY MASS INDEX: 36.8 KG/M2 | DIASTOLIC BLOOD PRESSURE: 82 MMHG | WEIGHT: 200 LBS | HEART RATE: 87 BPM | SYSTOLIC BLOOD PRESSURE: 163 MMHG | HEIGHT: 62 IN

## 2019-10-24 PROCEDURE — 31579 LARYNGOSCOPY TELESCOPIC: CPT

## 2019-10-24 PROCEDURE — 99214 OFFICE O/P EST MOD 30 MIN: CPT | Mod: 25

## 2019-10-24 RX ORDER — METHYLPREDNISOLONE 4 MG/1
4 TABLET ORAL
Qty: 1 | Refills: 1 | Status: DISCONTINUED | COMMUNITY
Start: 2019-08-29 | End: 2019-10-24

## 2019-10-24 NOTE — CONSULT LETTER
[Dear  ___] : Dear  [unfilled], [Consult Letter:] : I had the pleasure of evaluating your patient, [unfilled]. [Please see my note below.] : Please see my note below. [Consult Closing:] : Thank you very much for allowing me to participate in the care of this patient.  If you have any questions, please do not hesitate to contact me. [Sincerely,] : Sincerely, [FreeTextEntry3] : Anthony Hernandez MD, PhD\par Chief, Division of Laryngology\par Department of Otolaryngology\par [facility]\par Pediatric Otolaryngology, [facility]\par  of Otolaryngology\par [facility] at [facility]\par \par \par

## 2019-10-24 NOTE — PHYSICAL EXAM
[de-identified] : small old stable red hamartoma on TM [Laryngoscopy Performed] : laryngoscopy was performed, see procedure section for findings [Midline] : trachea located in midline position [Normal] : no rashes [de-identified] : moderately raspy and breathy voice, mild biphasic stridor

## 2019-10-24 NOTE — REASON FOR VISIT
[Subsequent Evaluation] : a subsequent evaluation for [Family Member] : family member [FreeTextEntry2] : s/p LTS and hoarseness

## 2019-10-24 NOTE — HISTORY OF PRESENT ILLNESS
[de-identified] : 63 year old female s/p LTS excision 11/2018 and hoarseness. Reports voice is the same. Breathing is worse today, comes and goes, feels dry. Completed methylprednisolone as prescribed.  Using budesonide and saline. Denies dysphagia, throat pain, throat infections since the last visit. Had night sweats at night on the medrol pack. Using neb and taking omeprazole. Current URI.\par

## 2019-12-03 ENCOUNTER — OUTPATIENT (OUTPATIENT)
Dept: OUTPATIENT SERVICES | Facility: HOSPITAL | Age: 64
LOS: 1 days | End: 2019-12-03
Payer: MEDICARE

## 2019-12-03 VITALS
RESPIRATION RATE: 16 BRPM | WEIGHT: 201.94 LBS | HEART RATE: 87 BPM | OXYGEN SATURATION: 99 % | HEIGHT: 61 IN | SYSTOLIC BLOOD PRESSURE: 140 MMHG | TEMPERATURE: 98 F | DIASTOLIC BLOOD PRESSURE: 90 MMHG

## 2019-12-03 DIAGNOSIS — Z98.89 OTHER SPECIFIED POSTPROCEDURAL STATES: Chronic | ICD-10-CM

## 2019-12-03 DIAGNOSIS — J38.7 OTHER DISEASES OF LARYNX: Chronic | ICD-10-CM

## 2019-12-03 DIAGNOSIS — J38.6 STENOSIS OF LARYNX: ICD-10-CM

## 2019-12-03 DIAGNOSIS — J39.8 OTHER SPECIFIED DISEASES OF UPPER RESPIRATORY TRACT: ICD-10-CM

## 2019-12-03 LAB
ANION GAP SERPL CALC-SCNC: 16 MMO/L — HIGH (ref 7–14)
BUN SERPL-MCNC: 6 MG/DL — LOW (ref 7–23)
CALCIUM SERPL-MCNC: 10.4 MG/DL — SIGNIFICANT CHANGE UP (ref 8.4–10.5)
CHLORIDE SERPL-SCNC: 100 MMOL/L — SIGNIFICANT CHANGE UP (ref 98–107)
CO2 SERPL-SCNC: 28 MMOL/L — SIGNIFICANT CHANGE UP (ref 22–31)
CREAT SERPL-MCNC: 0.73 MG/DL — SIGNIFICANT CHANGE UP (ref 0.5–1.3)
GLUCOSE SERPL-MCNC: 126 MG/DL — HIGH (ref 70–99)
HCT VFR BLD CALC: 43.1 % — SIGNIFICANT CHANGE UP (ref 34.5–45)
HGB BLD-MCNC: 13.9 G/DL — SIGNIFICANT CHANGE UP (ref 11.5–15.5)
MCHC RBC-ENTMCNC: 26.7 PG — LOW (ref 27–34)
MCHC RBC-ENTMCNC: 32.3 % — SIGNIFICANT CHANGE UP (ref 32–36)
MCV RBC AUTO: 82.9 FL — SIGNIFICANT CHANGE UP (ref 80–100)
NRBC # FLD: 0 K/UL — SIGNIFICANT CHANGE UP (ref 0–0)
PLATELET # BLD AUTO: 566 K/UL — HIGH (ref 150–400)
PMV BLD: 8.9 FL — SIGNIFICANT CHANGE UP (ref 7–13)
POTASSIUM SERPL-MCNC: 3.6 MMOL/L — SIGNIFICANT CHANGE UP (ref 3.5–5.3)
POTASSIUM SERPL-SCNC: 3.6 MMOL/L — SIGNIFICANT CHANGE UP (ref 3.5–5.3)
RBC # BLD: 5.2 M/UL — SIGNIFICANT CHANGE UP (ref 3.8–5.2)
RBC # FLD: 14.6 % — HIGH (ref 10.3–14.5)
SODIUM SERPL-SCNC: 144 MMOL/L — SIGNIFICANT CHANGE UP (ref 135–145)
WBC # BLD: 9.19 K/UL — SIGNIFICANT CHANGE UP (ref 3.8–10.5)
WBC # FLD AUTO: 9.19 K/UL — SIGNIFICANT CHANGE UP (ref 3.8–10.5)

## 2019-12-03 PROCEDURE — 93010 ELECTROCARDIOGRAM REPORT: CPT

## 2019-12-03 RX ORDER — SODIUM CHLORIDE 9 MG/ML
1000 INJECTION, SOLUTION INTRAVENOUS
Refills: 0 | Status: DISCONTINUED | OUTPATIENT
Start: 2019-12-09 | End: 2019-12-28

## 2019-12-03 NOTE — H&P PST ADULT - NEGATIVE GASTROINTESTINAL SYMPTOMS
no diarrhea/no constipation/no melena/no vomiting/no nausea/no change in bowel habits/no abdominal pain

## 2019-12-03 NOTE — H&P PST ADULT - NEGATIVE GENERAL GENITOURINARY SYMPTOMS
no incontinence/no urinary hesitancy/no flank pain R/no flank pain L/no hematuria/no bladder infections/normal urinary frequency/no dysuria

## 2019-12-03 NOTE — H&P PST ADULT - NEGATIVE ENMT SYMPTOMS
no throat pain/no dysphagia/no hearing difficulty/no vertigo/no ear pain/no tinnitus/no nose bleeds/no gum bleeding

## 2019-12-03 NOTE — H&P PST ADULT - NSICDXPROBLEM_GEN_ALL_CORE_FT
PROBLEM DIAGNOSES  Problem: Stenosis of larynx  Assessment and Plan: Pt is scheduled for microdirect laryngoscopy and bronchoscopy with excision stenosis on 12/9/19.   Verbal and written pre op instructions reviewed with patient and pt able to verbalize understanding.   Pt instructed to take omeprazole AM of surgery with a sip of water, pt able to verbalize understanding.  Pt instructed to take her nebulizer as prescribed.  Dr. Babcock in to assess patient, states pt does not appear to be in distress, instructed patient to take nebulizer as soon as she gets home and go to ED if respiratory status worsens, pt able to verbalize understanding. PROBLEM DIAGNOSES  Problem: Stenosis of larynx  Assessment and Plan: Pt is scheduled for microdirect laryngoscopy and bronchoscopy with excision stenosis on 12/9/19.   Verbal and written pre op instructions reviewed with patient and pt able to verbalize understanding.   Pt instructed to take omeprazole AM of surgery with a sip of water, pt able to verbalize understanding.  Pt instructed to take her nebulizer as prescribed.  Dr. Babcock in to assess patient, states pt does not appear to be in distress, instructed patient to take nebulizer as soon as she gets home and go to ED if respiratory status worsens, pt able to verbalize understanding.  OR booking notified of pt's latex allergy via fax.

## 2019-12-03 NOTE — H&P PST ADULT - NSICDXFAMILYHX_GEN_ALL_CORE_FT
FAMILY HISTORY:  Family history of thyroid cancer    Sibling  Still living? No  Family history of leukemia, Age at diagnosis: Age Unknown

## 2019-12-03 NOTE — H&P PST ADULT - NEGATIVE NEUROLOGICAL SYMPTOMS
no headache/no confusion/no transient paralysis/no paresthesias/no syncope/no vertigo/no weakness/no generalized seizures/no focal seizures/no loss of sensation/no difficulty walking/no loss of consciousness/no hemiparesis/no tremors/no facial palsy

## 2019-12-03 NOTE — H&P PST ADULT - RS GEN PE MLT RESP DETAILS PC
with inspiration and expiration/no chest wall tenderness/stridor/airway patent/respirations non-labored/breath sounds equal airway patent/breath sounds equal/respirations non-labored/stridor/pt able to carry conversation, does not appear to be in distress.  Pt states she forgot to take her nebulizer this morning and states she will head stright home and take her nebulizer treatment after PST/no chest wall tenderness

## 2019-12-03 NOTE — H&P PST ADULT - NEGATIVE CARDIOVASCULAR SYMPTOMS
no claudication/no chest pain/no paroxysmal nocturnal dyspnea/no peripheral edema/no orthopnea/no palpitations

## 2019-12-03 NOTE — H&P PST ADULT - NEGATIVE PSYCHIATRIC SYMPTOMS
no insomnia/no memory loss/no suicidal ideation/no auditory hallucinations/no anxiety/no visual hallucinations

## 2019-12-03 NOTE — H&P PST ADULT - MUSCULOSKELETAL
details… detailed exam no calf tenderness/ROM intact/normal strength/no joint erythema/no joint warmth/no joint swelling

## 2019-12-03 NOTE — H&P PST ADULT - HISTORY OF PRESENT ILLNESS
63 y/o female presents to PST for preoperative evaluation with dx of stenosis of larynx t.  h/o tracheal/ subglottic stenosis since 2010. s/p multiple laryngoscopies with  laser/dilation procedures since diagnosis with most recent one 11/6/2018.  Pt reports dyspnea that has been worsening over the past month, was seen by ENT and scheduled for procedure.  Pt is scheduled for microdirect laryngoscopy and bronchoscopy with excision stenosis on 12/9/19.

## 2019-12-03 NOTE — H&P PST ADULT - NSICDXPASTMEDICALHX_GEN_ALL_CORE_FT
PAST MEDICAL HISTORY:  Benign neoplasm of larynx     Larynx disorder Stenosis of larynx    Latex allergy     Obesity     Other specified diseases of upper respiratory tract     Stridor     Tracheal/bronchial disease     Voice disturbance

## 2019-12-03 NOTE — H&P PST ADULT - NEGATIVE MUSCULOSKELETAL SYMPTOMS
no muscle cramps/no muscle weakness/no neck pain/no leg pain R/no back pain/no leg pain L/no arthralgia/no arthritis/no stiffness/no arm pain L/no myalgia/no arm pain R

## 2019-12-03 NOTE — H&P PST ADULT - NSICDXPASTSURGICALHX_GEN_ALL_CORE_FT
PAST SURGICAL HISTORY:  History of bronchoscopy     Larynx disorder Laser surgery 04/2010, 02/2012, 02/2013, 4/2015, 1/2018  Balloon dilation 11/2018

## 2019-12-03 NOTE — H&P PST ADULT - NEGATIVE GENERAL SYMPTOMS
no weight gain/no polyuria/no polydipsia/no anorexia/no fever/no weight loss/no polyphagia/no chills/no sweating no fever/no weight gain/no weight loss/no chills/no sweating/no anorexia

## 2019-12-08 ENCOUNTER — TRANSCRIPTION ENCOUNTER (OUTPATIENT)
Age: 64
End: 2019-12-08

## 2019-12-09 ENCOUNTER — APPOINTMENT (OUTPATIENT)
Dept: OTOLARYNGOLOGY | Facility: HOSPITAL | Age: 64
End: 2019-12-09

## 2019-12-09 ENCOUNTER — OUTPATIENT (OUTPATIENT)
Dept: OUTPATIENT SERVICES | Facility: HOSPITAL | Age: 64
LOS: 1 days | Discharge: HOME | End: 2019-12-09
Payer: MEDICARE

## 2019-12-09 ENCOUNTER — RESULT REVIEW (OUTPATIENT)
Age: 64
End: 2019-12-09

## 2019-12-09 VITALS
TEMPERATURE: 98 F | HEART RATE: 77 BPM | SYSTOLIC BLOOD PRESSURE: 154 MMHG | DIASTOLIC BLOOD PRESSURE: 71 MMHG | RESPIRATION RATE: 16 BRPM | OXYGEN SATURATION: 100 %

## 2019-12-09 VITALS
RESPIRATION RATE: 18 BRPM | OXYGEN SATURATION: 95 % | HEART RATE: 81 BPM | SYSTOLIC BLOOD PRESSURE: 140 MMHG | DIASTOLIC BLOOD PRESSURE: 70 MMHG

## 2019-12-09 DIAGNOSIS — J38.7 OTHER DISEASES OF LARYNX: Chronic | ICD-10-CM

## 2019-12-09 DIAGNOSIS — Z98.89 OTHER SPECIFIED POSTPROCEDURAL STATES: Chronic | ICD-10-CM

## 2019-12-09 DIAGNOSIS — J39.8 OTHER SPECIFIED DISEASES OF UPPER RESPIRATORY TRACT: ICD-10-CM

## 2019-12-09 PROCEDURE — 31641 BRONCHOSCOPY TREAT BLOCKAGE: CPT

## 2019-12-09 PROCEDURE — 88305 TISSUE EXAM BY PATHOLOGIST: CPT | Mod: 26

## 2019-12-09 PROCEDURE — 31541 LARYNSCOP W/TUMR EXC + SCOPE: CPT | Mod: 59

## 2019-12-09 RX ORDER — FENTANYL CITRATE 50 UG/ML
25 INJECTION INTRAVENOUS
Refills: 0 | Status: DISCONTINUED | OUTPATIENT
Start: 2019-12-09 | End: 2019-12-09

## 2019-12-09 RX ORDER — ONDANSETRON 8 MG/1
4 TABLET, FILM COATED ORAL ONCE
Refills: 0 | Status: DISCONTINUED | OUTPATIENT
Start: 2019-12-09 | End: 2019-12-28

## 2019-12-09 RX ORDER — ACETAMINOPHEN 500 MG
650 TABLET ORAL EVERY 6 HOURS
Refills: 0 | Status: DISCONTINUED | OUTPATIENT
Start: 2019-12-09 | End: 2019-12-28

## 2019-12-09 RX ORDER — ACETAMINOPHEN 500 MG
2 TABLET ORAL
Qty: 0 | Refills: 0 | DISCHARGE
Start: 2019-12-09

## 2019-12-09 RX ADMIN — Medication 650 MILLIGRAM(S): at 12:45

## 2019-12-09 RX ADMIN — Medication 650 MILLIGRAM(S): at 12:15

## 2019-12-09 NOTE — ASU DISCHARGE PLAN (ADULT/PEDIATRIC) - NURSING INSTRUCTIONS
Contact the MD for any of the following symptoms:  pain not relieved by medications, fever 100.4 or greater, bleeding, redness, excessive swelling and/or warmth or drainage from the surgical site, inability to tolerate food and/or drink. Any shortness of breath and/or chest discomfort return to the ER.

## 2019-12-09 NOTE — ASU DISCHARGE PLAN (ADULT/PEDIATRIC) - ASU DC SPECIAL INSTRUCTIONSFT
After Surgery Instructions  Hygiene  Return to usual bathing  Diet  Return to your usual diet  Activity  Resume usual activity   Medications  Please resume your normal medications   Pain medications  For mild or moderate pain, please take over the counter tylenol or motrin as needed for pain  Follow up  Please follow up with Dr. Hernandez in 2 weeks. Please call the otolaryngology office at  to confirm your appointment

## 2019-12-09 NOTE — ASU DISCHARGE PLAN (ADULT/PEDIATRIC) - CALL YOUR DOCTOR IF YOU HAVE ANY OF THE FOLLOWING:
Inability to tolerate liquids or foods/Pain not relieved by Medications/Swelling that gets worse/Nausea and vomiting that does not stop/Bleeding that does not stop Swelling that gets worse/Bleeding that does not stop/Pain not relieved by Medications/Inability to tolerate liquids or foods/Nausea and vomiting that does not stop/Fever greater than (need to indicate Fahrenheit or Celsius)

## 2020-01-09 ENCOUNTER — APPOINTMENT (OUTPATIENT)
Dept: OTOLARYNGOLOGY | Facility: CLINIC | Age: 65
End: 2020-01-09
Payer: MEDICARE

## 2020-01-09 VITALS
WEIGHT: 200 LBS | HEART RATE: 81 BPM | RESPIRATION RATE: 18 BRPM | HEIGHT: 62 IN | DIASTOLIC BLOOD PRESSURE: 85 MMHG | BODY MASS INDEX: 36.8 KG/M2 | SYSTOLIC BLOOD PRESSURE: 164 MMHG

## 2020-01-09 VITALS
BODY MASS INDEX: 36.8 KG/M2 | DIASTOLIC BLOOD PRESSURE: 85 MMHG | HEART RATE: 81 BPM | RESPIRATION RATE: 18 BRPM | HEIGHT: 62 IN | SYSTOLIC BLOOD PRESSURE: 164 MMHG | WEIGHT: 200 LBS

## 2020-01-09 DIAGNOSIS — Z09 ENCOUNTER FOR FOLLOW-UP EXAMINATION AFTER COMPLETED TREATMENT FOR CONDITIONS OTHER THAN MALIGNANT NEOPLASM: ICD-10-CM

## 2020-01-09 PROCEDURE — 99214 OFFICE O/P EST MOD 30 MIN: CPT | Mod: 25

## 2020-01-09 PROCEDURE — 31575 DIAGNOSTIC LARYNGOSCOPY: CPT

## 2020-01-09 NOTE — CONSULT LETTER
[Dear  ___] : Dear  [unfilled], [Consult Letter:] : I had the pleasure of evaluating your patient, [unfilled]. [Please see my note below.] : Please see my note below. [Consult Closing:] : Thank you very much for allowing me to participate in the care of this patient.  If you have any questions, please do not hesitate to contact me. [Sincerely,] : Sincerely, [FreeTextEntry3] : Anthony Hernandez MD, PhD\par Chief, Division of Laryngology\par Department of Otolaryngology\par [facility]\par Pediatric Otolaryngology, [facility]\par  of Otolaryngology\par [facility] [employer] [facility]\par \par \par

## 2020-01-09 NOTE — REASON FOR VISIT
[Subsequent Evaluation] : a subsequent evaluation for [Other: _____] : [unfilled] [FreeTextEntry2] : follow up visit, s/p LTS/ Hoarseness s/p esophageal dilation December 9, 2019

## 2020-01-09 NOTE — HISTORY OF PRESENT ILLNESS
[de-identified] : 64 year old female s/p laryngotracheal dilation December 9, 2019, denies dyspnea , dysphagia voice stable voice quality slightly improved. With salty foods, getting clogged but otherwise, feels great. Happy with postop period. Using budesonide once per day and saline oncer day. \par

## 2020-01-09 NOTE — PHYSICAL EXAM
[de-identified] : small old stable red hamartoma on TM [Midline] : trachea located in midline position [Laryngoscopy Performed] : laryngoscopy was performed, see procedure section for findings [Normal] : no rashes [de-identified] : moderately raspy and breathy voice, NO biphasic stridor!

## 2020-03-12 NOTE — ASU DISCHARGE PLAN (ADULT/PEDIATRIC). - POST OP PHONE #
done
[phone] pt. granted permission to leave message /and or speak with whoever answers the phone.

## 2020-05-21 ENCOUNTER — APPOINTMENT (OUTPATIENT)
Dept: OTOLARYNGOLOGY | Facility: CLINIC | Age: 65
End: 2020-05-21
Payer: MEDICARE

## 2020-05-21 VITALS
SYSTOLIC BLOOD PRESSURE: 148 MMHG | WEIGHT: 200 LBS | DIASTOLIC BLOOD PRESSURE: 88 MMHG | HEIGHT: 62 IN | HEART RATE: 76 BPM | BODY MASS INDEX: 36.8 KG/M2 | TEMPERATURE: 98.6 F

## 2020-05-21 PROCEDURE — 99214 OFFICE O/P EST MOD 30 MIN: CPT | Mod: 25

## 2020-05-21 PROCEDURE — 31579 LARYNGOSCOPY TELESCOPIC: CPT

## 2020-05-21 NOTE — HISTORY OF PRESENT ILLNESS
[de-identified] : 64 year old female follow voice hoarseness. s/p LTS/ Hoarseness s/p dilation 12/9/19.Reports voice has been stable since the last visit. Denies losing voice completely. Denies dyspnea, dysphagia. Denies fevers, throat infections. Reports use of omeprazole daily. Reports use of saline twice a day and budesonide once a day. Hasn't felt any decline. No hemoptysis.\par

## 2020-05-21 NOTE — PHYSICAL EXAM
[de-identified] : small old stable red hamartoma on TM [Midline] : trachea located in midline position [Laryngoscopy Performed] : laryngoscopy was performed, see procedure section for findings [Normal] : no rashes [de-identified] : moderately raspy and breathy voice, NO biphasic stridor!

## 2020-07-02 NOTE — ASU PREOP CHECKLIST - LATEX ALLERGY
Continue your prostate medications  Use Tylenol and ibuprofen for pain  Ibuprofen if you can take it can be very helpful for inflammation  yes yes/OR rm#2 called & made aware

## 2020-09-10 ENCOUNTER — APPOINTMENT (OUTPATIENT)
Dept: OTOLARYNGOLOGY | Facility: CLINIC | Age: 65
End: 2020-09-10
Payer: MEDICARE

## 2020-09-10 VITALS
SYSTOLIC BLOOD PRESSURE: 159 MMHG | HEIGHT: 62 IN | WEIGHT: 200 LBS | HEART RATE: 66 BPM | BODY MASS INDEX: 36.8 KG/M2 | DIASTOLIC BLOOD PRESSURE: 84 MMHG

## 2020-09-10 PROCEDURE — 31579 LARYNGOSCOPY TELESCOPIC: CPT

## 2020-09-10 PROCEDURE — 99214 OFFICE O/P EST MOD 30 MIN: CPT | Mod: 25

## 2020-09-10 NOTE — REASON FOR VISIT
[Subsequent Evaluation] : a subsequent evaluation for [Family Member] : family member [FreeTextEntry2] : hoarseness

## 2020-09-10 NOTE — PHYSICAL EXAM
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 [Normal] : no rashes

## 2020-09-10 NOTE — CONSULT LETTER
[Dear  ___] : Dear  [unfilled], [Consult Letter:] : I had the pleasure of evaluating your patient, [unfilled]. [Please see my note below.] : Please see my note below. [Consult Closing:] : Thank you very much for allowing me to participate in the care of this patient.  If you have any questions, please do not hesitate to contact me. [Sincerely,] : Sincerely, [FreeTextEntry3] : Anthony Hernandez MD, PhD\par Chief, Division of Laryngology\par Department of Otolaryngology\par [facility]\par Pediatric Otolaryngology, [facility]\par  of Otolaryngology\par [facility] [employer] [facility]\par \par  [FreeTextEntry2] : Dr Padilla Ley

## 2020-09-10 NOTE — HISTORY OF PRESENT ILLNESS
[de-identified] : 64 year old female follow up for voice hoarseness s/p LTS/ Hoarseness s/p dilation 12/9/19.. Reports voice has gotten worse for the past month. \par Reports being bit by ticks in 06/2020, suspected for lyme's disease, treated with antibiotic but was negative. Reports breathing has gotten worse since the end of July, worse on humid days. Bitten by bees, fell off back stoop. Denies dysphagia, odynophagia, throat pain recent fevers, bleeding or hemoptysis. Continues to use omeprazole almost daily. Using budesonide twice a day. Using Astelin as needed, more frequently over the past month. Much louder stridor. Increased SOB with humidity. \par

## 2020-10-21 ENCOUNTER — INPATIENT (INPATIENT)
Facility: HOSPITAL | Age: 65
LOS: 2 days | Discharge: HOME | End: 2020-10-24
Attending: OTOLARYNGOLOGY | Admitting: OTOLARYNGOLOGY
Payer: MEDICARE

## 2020-10-21 ENCOUNTER — RESULT REVIEW (OUTPATIENT)
Age: 65
End: 2020-10-21

## 2020-10-21 ENCOUNTER — APPOINTMENT (OUTPATIENT)
Dept: OTOLARYNGOLOGY | Facility: CLINIC | Age: 65
End: 2020-10-21
Payer: MEDICARE

## 2020-10-21 VITALS
HEIGHT: 62 IN | DIASTOLIC BLOOD PRESSURE: 90 MMHG | HEART RATE: 94 BPM | OXYGEN SATURATION: 100 % | SYSTOLIC BLOOD PRESSURE: 157 MMHG | TEMPERATURE: 97 F

## 2020-10-21 VITALS
WEIGHT: 196 LBS | DIASTOLIC BLOOD PRESSURE: 91 MMHG | HEIGHT: 62 IN | SYSTOLIC BLOOD PRESSURE: 125 MMHG | HEART RATE: 93 BPM | BODY MASS INDEX: 36.07 KG/M2

## 2020-10-21 DIAGNOSIS — J38.6 STENOSIS OF LARYNX: ICD-10-CM

## 2020-10-21 DIAGNOSIS — R06.1 STRIDOR: ICD-10-CM

## 2020-10-21 DIAGNOSIS — J38.7 OTHER DISEASES OF LARYNX: Chronic | ICD-10-CM

## 2020-10-21 DIAGNOSIS — Z98.89 OTHER SPECIFIED POSTPROCEDURAL STATES: Chronic | ICD-10-CM

## 2020-10-21 LAB
ALBUMIN SERPL ELPH-MCNC: 4.7 G/DL — SIGNIFICANT CHANGE UP (ref 3.3–5)
ALP SERPL-CCNC: 136 U/L — HIGH (ref 40–120)
ALT FLD-CCNC: 41 U/L — HIGH (ref 4–33)
ANION GAP SERPL CALC-SCNC: 13 MMO/L — SIGNIFICANT CHANGE UP (ref 7–14)
APTT BLD: 39.2 SEC — HIGH (ref 27–36.3)
AST SERPL-CCNC: 27 U/L — SIGNIFICANT CHANGE UP (ref 4–32)
BASOPHILS # BLD AUTO: 0.05 K/UL — SIGNIFICANT CHANGE UP (ref 0–0.2)
BASOPHILS NFR BLD AUTO: 0.5 % — SIGNIFICANT CHANGE UP (ref 0–2)
BILIRUB SERPL-MCNC: 0.6 MG/DL — SIGNIFICANT CHANGE UP (ref 0.2–1.2)
BLD GP AB SCN SERPL QL: NEGATIVE — SIGNIFICANT CHANGE UP
BUN SERPL-MCNC: 9 MG/DL — SIGNIFICANT CHANGE UP (ref 7–23)
CALCIUM SERPL-MCNC: 10.7 MG/DL — HIGH (ref 8.4–10.5)
CHLORIDE SERPL-SCNC: 97 MMOL/L — LOW (ref 98–107)
CO2 SERPL-SCNC: 32 MMOL/L — HIGH (ref 22–31)
CREAT SERPL-MCNC: 0.99 MG/DL — SIGNIFICANT CHANGE UP (ref 0.5–1.3)
EOSINOPHIL # BLD AUTO: 0.13 K/UL — SIGNIFICANT CHANGE UP (ref 0–0.5)
EOSINOPHIL NFR BLD AUTO: 1.3 % — SIGNIFICANT CHANGE UP (ref 0–6)
GLUCOSE SERPL-MCNC: 122 MG/DL — HIGH (ref 70–99)
HCT VFR BLD CALC: 42.8 % — SIGNIFICANT CHANGE UP (ref 34.5–45)
HGB BLD-MCNC: 13.7 G/DL — SIGNIFICANT CHANGE UP (ref 11.5–15.5)
IMM GRANULOCYTES NFR BLD AUTO: 0.5 % — SIGNIFICANT CHANGE UP (ref 0–1.5)
INR BLD: 1.11 — SIGNIFICANT CHANGE UP (ref 0.88–1.16)
LYMPHOCYTES # BLD AUTO: 2.06 K/UL — SIGNIFICANT CHANGE UP (ref 1–3.3)
LYMPHOCYTES # BLD AUTO: 20.5 % — SIGNIFICANT CHANGE UP (ref 13–44)
MAGNESIUM SERPL-MCNC: 2 MG/DL — SIGNIFICANT CHANGE UP (ref 1.6–2.6)
MCHC RBC-ENTMCNC: 26.7 PG — LOW (ref 27–34)
MCHC RBC-ENTMCNC: 32 % — SIGNIFICANT CHANGE UP (ref 32–36)
MCV RBC AUTO: 83.3 FL — SIGNIFICANT CHANGE UP (ref 80–100)
MONOCYTES # BLD AUTO: 0.68 K/UL — SIGNIFICANT CHANGE UP (ref 0–0.9)
MONOCYTES NFR BLD AUTO: 6.8 % — SIGNIFICANT CHANGE UP (ref 2–14)
NEUTROPHILS # BLD AUTO: 7.1 K/UL — SIGNIFICANT CHANGE UP (ref 1.8–7.4)
NEUTROPHILS NFR BLD AUTO: 70.4 % — SIGNIFICANT CHANGE UP (ref 43–77)
NRBC # FLD: 0 K/UL — SIGNIFICANT CHANGE UP (ref 0–0)
PHOSPHATE SERPL-MCNC: 2.8 MG/DL — SIGNIFICANT CHANGE UP (ref 2.5–4.5)
PLATELET # BLD AUTO: 644 K/UL — HIGH (ref 150–400)
PMV BLD: 8.9 FL — SIGNIFICANT CHANGE UP (ref 7–13)
POTASSIUM SERPL-MCNC: 3.6 MMOL/L — SIGNIFICANT CHANGE UP (ref 3.5–5.3)
POTASSIUM SERPL-SCNC: 3.6 MMOL/L — SIGNIFICANT CHANGE UP (ref 3.5–5.3)
PROT SERPL-MCNC: 7.3 G/DL — SIGNIFICANT CHANGE UP (ref 6–8.3)
PROTHROM AB SERPL-ACNC: 12.6 SEC — SIGNIFICANT CHANGE UP (ref 10.6–13.6)
RBC # BLD: 5.14 M/UL — SIGNIFICANT CHANGE UP (ref 3.8–5.2)
RBC # FLD: 14.4 % — SIGNIFICANT CHANGE UP (ref 10.3–14.5)
RH IG SCN BLD-IMP: POSITIVE — SIGNIFICANT CHANGE UP
SARS-COV-2 RNA SPEC QL NAA+PROBE: SIGNIFICANT CHANGE UP
SODIUM SERPL-SCNC: 142 MMOL/L — SIGNIFICANT CHANGE UP (ref 135–145)
WBC # BLD: 10.07 K/UL — SIGNIFICANT CHANGE UP (ref 3.8–10.5)
WBC # FLD AUTO: 10.07 K/UL — SIGNIFICANT CHANGE UP (ref 3.8–10.5)

## 2020-10-21 PROCEDURE — 88305 TISSUE EXAM BY PATHOLOGIST: CPT | Mod: 26

## 2020-10-21 PROCEDURE — 88311 DECALCIFY TISSUE: CPT | Mod: 26

## 2020-10-21 PROCEDURE — 99214 OFFICE O/P EST MOD 30 MIN: CPT | Mod: 25

## 2020-10-21 PROCEDURE — 99285 EMERGENCY DEPT VISIT HI MDM: CPT

## 2020-10-21 PROCEDURE — 99223 1ST HOSP IP/OBS HIGH 75: CPT

## 2020-10-21 PROCEDURE — 71045 X-RAY EXAM CHEST 1 VIEW: CPT | Mod: 26

## 2020-10-21 PROCEDURE — 31579 LARYNGOSCOPY TELESCOPIC: CPT

## 2020-10-21 RX ORDER — ENOXAPARIN SODIUM 100 MG/ML
40 INJECTION SUBCUTANEOUS DAILY
Refills: 0 | Status: DISCONTINUED | OUTPATIENT
Start: 2020-10-21 | End: 2020-10-21

## 2020-10-21 RX ORDER — FAMOTIDINE 10 MG/ML
40 INJECTION INTRAVENOUS ONCE
Refills: 0 | Status: COMPLETED | OUTPATIENT
Start: 2020-10-21 | End: 2020-10-21

## 2020-10-21 RX ORDER — POTASSIUM CHLORIDE 20 MEQ
20 PACKET (EA) ORAL ONCE
Refills: 0 | Status: DISCONTINUED | OUTPATIENT
Start: 2020-10-21 | End: 2020-10-21

## 2020-10-21 RX ORDER — FAMOTIDINE 10 MG/ML
40 INJECTION INTRAVENOUS DAILY
Refills: 0 | Status: DISCONTINUED | OUTPATIENT
Start: 2020-10-22 | End: 2020-10-24

## 2020-10-21 RX ORDER — BUDESONIDE, MICRONIZED 100 %
0.5 POWDER (GRAM) MISCELLANEOUS
Refills: 0 | Status: DISCONTINUED | OUTPATIENT
Start: 2020-10-21 | End: 2020-10-24

## 2020-10-21 RX ORDER — DEXAMETHASONE 0.5 MG/5ML
8 ELIXIR ORAL ONCE
Refills: 0 | Status: DISCONTINUED | OUTPATIENT
Start: 2020-10-21 | End: 2020-10-21

## 2020-10-21 RX ORDER — DEXAMETHASONE 0.5 MG/5ML
8 ELIXIR ORAL EVERY 8 HOURS
Refills: 0 | Status: DISCONTINUED | OUTPATIENT
Start: 2020-10-21 | End: 2020-10-22

## 2020-10-21 RX ORDER — POTASSIUM CHLORIDE 20 MEQ
10 PACKET (EA) ORAL ONCE
Refills: 0 | Status: COMPLETED | OUTPATIENT
Start: 2020-10-21 | End: 2020-10-21

## 2020-10-21 RX ORDER — DEXAMETHASONE 0.5 MG/5ML
8 ELIXIR ORAL ONCE
Refills: 0 | Status: COMPLETED | OUTPATIENT
Start: 2020-10-21 | End: 2020-10-21

## 2020-10-21 RX ORDER — HEPARIN SODIUM 5000 [USP'U]/ML
5000 INJECTION INTRAVENOUS; SUBCUTANEOUS EVERY 12 HOURS
Refills: 0 | Status: DISCONTINUED | OUTPATIENT
Start: 2020-10-21 | End: 2020-10-24

## 2020-10-21 RX ORDER — SODIUM CHLORIDE 9 MG/ML
1000 INJECTION, SOLUTION INTRAVENOUS
Refills: 0 | Status: DISCONTINUED | OUTPATIENT
Start: 2020-10-21 | End: 2020-10-23

## 2020-10-21 RX ADMIN — Medication 0.5 MILLIGRAM(S): at 22:20

## 2020-10-21 RX ADMIN — FAMOTIDINE 40 MILLIGRAM(S): 10 INJECTION INTRAVENOUS at 15:34

## 2020-10-21 RX ADMIN — Medication 100 MILLIEQUIVALENT(S): at 18:12

## 2020-10-21 RX ADMIN — HEPARIN SODIUM 5000 UNIT(S): 5000 INJECTION INTRAVENOUS; SUBCUTANEOUS at 21:46

## 2020-10-21 RX ADMIN — SODIUM CHLORIDE 70 MILLILITER(S): 9 INJECTION, SOLUTION INTRAVENOUS at 21:46

## 2020-10-21 RX ADMIN — Medication 101.6 MILLIGRAM(S): at 15:34

## 2020-10-21 RX ADMIN — Medication 101.6 MILLIGRAM(S): at 23:06

## 2020-10-21 NOTE — ED ADULT NURSE REASSESSMENT NOTE - NS ED NURSE REASSESS COMMENT FT1
Report received from day shift RN. Patient is resting in stretcher. A&Ox4. Respirations even and unlabored. Calm, cooperative, comfortable appearance. Transport dispatched to [facility]. Valuables remain at security. Belongings returned to patient. Stretcher in lowest position, wheels locked, side rails up as per protocol. Call bell in reach. Will continue to monitor.

## 2020-10-21 NOTE — ED ADULT NURSE NOTE - NSIMPLEMENTINTERV_GEN_ALL_ED
Implemented All Universal Safety Interventions:  [place] to call system. Call bell, personal items and telephone within reach. Instruct patient to call for assistance. Room bathroom lighting operational. Non-slip footwear when patient is off stretcher. Physically safe environment: no spills, clutter or unnecessary equipment. Stretcher in lowest position, wheels locked, appropriate side rails in place.

## 2020-10-21 NOTE — H&P ADULT - PROBLEM SELECTOR PLAN 2
- Add on for OR tonight after 6pm with Dr. Hernandez  - NPO  - IVF  - COVID swab  - Preoperative labs: CBC, CMP, mg, Phos, PT, APTT, and INR  - Consent obtained, in patient's chart  - Admits to floor with continuos pulse ox  - Patient seen with chief resident Dr. Medeiros  - Case discussed with Dr. Hernandez - Add on for OR tonight after 6pm with Dr. Hernandez  - NPO  - IVF  - COVID swab  - Preoperative labs: CBC, CMP, mg, Phos, PT, APTT, INR, type and screening.  - Consent obtained, in patient's chart  - Admits to floor with continuos pulse ox  - Patient seen with chief resident Dr. Medeiros  - Case discussed with Dr. Hernandez

## 2020-10-21 NOTE — PHYSICAL EXAM
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

## 2020-10-21 NOTE — ED ADULT NURSE NOTE - OBJECTIVE STATEMENT
Pr w/ hx of HTN, subglottic stenosis s/p excision in 12/2019 sent by Dr. Hernandez for possible direct laryngoscopy, bronchoscopy and dilation of the trachea stenosis. Pt c/o progressively worsening SOB KHAN with audible stridor, Pt p/w NAD breaths even unlabored audible stridor noted skin warm dry intact.  Pt denies chest pain difficulty breathing dizziness weakness numbness tingling.  20 g IV access obtained at right forearm labs and meds as ordered will monitor.

## 2020-10-21 NOTE — ED PROVIDER NOTE - PSH
History of bronchoscopy    Larynx disorder  Laser surgery 04/2010, 02/2012, 02/2013, 4/2015, 1/2018  Balloon dilation 11/2018

## 2020-10-21 NOTE — ED PROVIDER NOTE - OBJECTIVE STATEMENT
63yo F p/w SOB/stridor progressively worsening over 1 week. Per patient she has had more difficulty doing everything involving exertion over the last week, which is an abrupt change from her baseline problems with stenosis for the previous 10 years. She denies fever/chills, nausea/vomiting, chest pain, focal deficits, rash, or any further symptoms. 65yo F h/o subglottic stenosis p/w SOB/stridor progressively worsening over 1 week, sent in from ENT office for admission for surgery. Per patient she has had more difficulty doing everything involving exertion over the last week, which is an abrupt change from her baseline problems with stenosis for the previous 10 years. She denies fever/chills, nausea/vomiting, chest pain, focal deficits, rash, or any further symptoms.

## 2020-10-21 NOTE — CONSULT LETTER
[Dear  ___] : Dear  [unfilled], [Consult Letter:] : I had the pleasure of evaluating your patient, [unfilled]. [Please see my note below.] : Please see my note below. [Consult Closing:] : Thank you very much for allowing me to participate in the care of this patient.  If you have any questions, please do not hesitate to contact me. [Sincerely,] : Sincerely, [FreeTextEntry2] : Dr Padilla Ley  [FreeTextEntry3] : Anthony Hernandez MD, PhD\par Chief, Division of Laryngology\par Department of Otolaryngology\par [facility]\par Pediatric Otolaryngology, [facility]\par  of Otolaryngology\par [facility] [employer]\par

## 2020-10-21 NOTE — H&P ADULT - NSHPREVIEWOFSYSTEMS_GEN_ALL_CORE
ROS:   ENT: all negative except as noted in HPI   CV: denies palpitations  Pulm: denies SOB, cough, hemoptysis  GI: denies change in appetite, indigestion, n/v  : denies pertinent urinary symptoms, urgency  Neuro: denies numbness/tingling, loss of sensation  Psych: denies anxiety  MS: denies muscle weakness, instability  Heme: denies easy bruising or bleeding  Endo: denies heat/cold intolerance, excessive sweating  Vascular: denies LE edema

## 2020-10-21 NOTE — H&P ADULT - ATTENDING COMMENTS
As attending physician, I performed evaluation and agree with the above assessment and plan. I discussed the plan with ENT team and primary team. I reviewed appropriate radiology and/or lab work. I discussed plan with the patient or patient's family.

## 2020-10-21 NOTE — H&P ADULT - HISTORY OF PRESENT ILLNESS
65 y/o F with a PMHx of HTN and subglottic stenosis s/p excision in 12/2019 who presents to Dr. Hernandez's office today with SOB and audible stridor. Patient sent from office to ED for further evaluation and possible urgent OR for direct laryngoscopy, bronchoscopy and dilation of the trachea stenosis. Patient reports no trouble breathing, but felt some tightness in the throat. The only thing that is new was she starts losartan about 2 weeks ago, and since then, she has been feeling tightness in the throat. Able to tolerated solid and liquid. Denies URI symptoms, cough, fever, chills, sore throat, nausea, vomiting and abdominal pain.

## 2020-10-21 NOTE — H&P ADULT - NSHPPHYSICALEXAM_GEN_ALL_CORE
PHYSICAL EXAM:  Gen: NAD  Skin: No rashes, bruises, or lesions  Head: Normocephalic, Atraumatic  Face: no edema, erythema, or fluctuance. Parotid glands soft without mass  Eyes: no scleral injection  Ears: bilateral no evidence of any fluid drainage. No mastoid tenderness, erythema, or ear bulging  Nose: Nares bilaterally patent, no discharge  Mouth: No stridor, no drooling, no trismus.  Mucosa moist, tongue/uvula midline, oropharynx clear  Neck: Flat, supple, no lymphadenopathy, trachea midline, no masses  Lymphatic: No lymphadenopathy  Resp: biphasic stridor, suprasternal retraction  CV: no peripheral edema/cyanosis  GI: nondistended   Peripheral vascular: no JVD or edema  Neuro: facial nerve intact, no facial droop

## 2020-10-21 NOTE — H&P ADULT - ASSESSMENT
63 y/o F with a PMHx of HTN and subglottic stenosis s/p excision in 12/2019 who presents to Dr. Hernandez's office today with SOB and audible stridor. Patient sent from office to ED for further evaluation and possible urgent OR for direct laryngoscopy, bronchoscopy and dilation of the trachea stenosis. Patient is being scoped by Dr. Hernandez in the office shows subglottic stenosis.

## 2020-10-21 NOTE — HISTORY OF PRESENT ILLNESS
[de-identified] : 64 year female follow up for voice hoarseness s/p LTS/ Hoarseness s/p dilation 12/9/19. Reports voice and breathing has gotten worse since last visit. States haven't used Budesonide or saline rinse in 2 weeks. Denies dysphagia, odynophagia, throat pain recent fevers, bleeding or hemoptysis. Reports started Losartan 10/07/2020. Medrol didn't help until the second taper. BP ha been higher and has been started on meds. Has not been taking reflux meds, cannot tolerate it since starting BP med.

## 2020-10-21 NOTE — H&P ADULT - NSICDXPASTMEDICALHX_GEN_ALL_CORE_FT
PAST MEDICAL HISTORY:  Benign neoplasm of larynx     Hypertension     Larynx disorder Stenosis of larynx    Latex allergy     Obesity     Other specified diseases of upper respiratory tract     Stridor     Tracheal/bronchial disease     Voice disturbance

## 2020-10-21 NOTE — ED ADULT NURSE NOTE - PMH
Benign neoplasm of larynx    Hypertension    Larynx disorder  Stenosis of larynx  Latex allergy    Obesity    Other specified diseases of upper respiratory tract    Stridor    Tracheal/bronchial disease    Voice disturbance

## 2020-10-21 NOTE — PRE-OP CHECKLIST - PATIENT'S PERSONAL PROPERTY REMOVED
Belongings secured, Brought to security per ED Tech Belongings secured outside room 21 in ED. Valuables brought to security per ED Tech

## 2020-10-21 NOTE — ED ADULT NURSE REASSESSMENT NOTE - NS ED NURSE REASSESS COMMENT FT1
Pt resting in bed. Pt has audible stridor. Pt denies any current SOB. Pt in normal sinus rhythm on cardiac monitor. Will continue to monitor.

## 2020-10-21 NOTE — ED PROVIDER NOTE - CLINICAL SUMMARY MEDICAL DECISION MAKING FREE TEXT BOX
Patient presents with stridor and SOB over last week s/p multiple procedures w/Dr. Hernandez. ENT to admit.

## 2020-10-21 NOTE — ED PROVIDER NOTE - FAMILY HISTORY
Family history of thyroid cancer     Sibling  Still living? No  Family history of leukemia, Age at diagnosis: Age Unknown

## 2020-10-21 NOTE — H&P ADULT - PROBLEM SELECTOR PLAN 1
- Budesonide 0.5mg/2ml via neb BID   - Famotidine 40mg PO daily  - Decadron 8mg stat then Q8 hours for 24 hours  - Continuos pulse ox  - Oxygen PRN to keep SpO2 >92%  - Heliox PRN for stridor

## 2020-10-22 ENCOUNTER — TRANSCRIPTION ENCOUNTER (OUTPATIENT)
Age: 65
End: 2020-10-22

## 2020-10-22 DIAGNOSIS — Z01.818 ENCOUNTER FOR OTHER PREPROCEDURAL EXAMINATION: ICD-10-CM

## 2020-10-22 DIAGNOSIS — I10 ESSENTIAL (PRIMARY) HYPERTENSION: ICD-10-CM

## 2020-10-22 PROCEDURE — 99232 SBSQ HOSP IP/OBS MODERATE 35: CPT

## 2020-10-22 PROCEDURE — 99223 1ST HOSP IP/OBS HIGH 75: CPT

## 2020-10-22 RX ORDER — LOSARTAN POTASSIUM 100 MG/1
100 TABLET, FILM COATED ORAL DAILY
Refills: 0 | Status: DISCONTINUED | OUTPATIENT
Start: 2020-10-22 | End: 2020-10-24

## 2020-10-22 RX ORDER — HYDROCHLOROTHIAZIDE 25 MG
12.5 TABLET ORAL DAILY
Refills: 0 | Status: DISCONTINUED | OUTPATIENT
Start: 2020-10-22 | End: 2020-10-24

## 2020-10-22 RX ORDER — DEXAMETHASONE 0.5 MG/5ML
8 ELIXIR ORAL EVERY 8 HOURS
Refills: 0 | Status: DISCONTINUED | OUTPATIENT
Start: 2020-10-22 | End: 2020-10-23

## 2020-10-22 RX ORDER — INFLUENZA VIRUS VACCINE 15; 15; 15; 15 UG/.5ML; UG/.5ML; UG/.5ML; UG/.5ML
0.5 SUSPENSION INTRAMUSCULAR ONCE
Refills: 0 | Status: COMPLETED | OUTPATIENT
Start: 2020-10-22 | End: 2020-10-22

## 2020-10-22 RX ADMIN — Medication 12.5 MILLIGRAM(S): at 10:42

## 2020-10-22 RX ADMIN — FAMOTIDINE 40 MILLIGRAM(S): 10 INJECTION INTRAVENOUS at 17:06

## 2020-10-22 RX ADMIN — Medication 101.6 MILLIGRAM(S): at 15:00

## 2020-10-22 RX ADMIN — HEPARIN SODIUM 5000 UNIT(S): 5000 INJECTION INTRAVENOUS; SUBCUTANEOUS at 17:05

## 2020-10-22 RX ADMIN — Medication 0.5 MILLIGRAM(S): at 20:33

## 2020-10-22 RX ADMIN — Medication 101.6 MILLIGRAM(S): at 06:43

## 2020-10-22 RX ADMIN — HEPARIN SODIUM 5000 UNIT(S): 5000 INJECTION INTRAVENOUS; SUBCUTANEOUS at 06:42

## 2020-10-22 RX ADMIN — LOSARTAN POTASSIUM 100 MILLIGRAM(S): 100 TABLET, FILM COATED ORAL at 10:42

## 2020-10-22 RX ADMIN — Medication 101.6 MILLIGRAM(S): at 21:04

## 2020-10-22 RX ADMIN — Medication 0.5 MILLIGRAM(S): at 09:15

## 2020-10-22 NOTE — CONSULT NOTE ADULT - SUBJECTIVE AND OBJECTIVE BOX
Jose Alberto Jaime Jr, MD  page [phone]  HPI:  65 y/o F with a PMHx of HTN and subglottic stenosis s/p excision in 12/2019 who presents to Dr. Hernandez's office today with SOB and audible stridor. Patient sent from office to ED for further evaluation and possible urgent OR for direct laryngoscopy, bronchoscopy and dilation of the trachea stenosis. Patient reports no trouble breathing, but felt some tightness in the throat. The only thing that is new was she starts losartan about 2 weeks ago, and since then, she has been feeling tightness in the throat. Able to tolerated solid and liquid. Denies URI symptoms, cough, fever, chills, sore throat, nausea, vomiting and abdominal pain. (21 Oct 2020 15:17)    Patient denies history of CAD, CVA, chest pain.  She has good exercise tolerance when subglottic stenosis has been treated, able to climb stairs, ambulate without difficulty.  Currently she has difficulty breathing due to limitation of air intake by stenosis.  She has had ballon dilatations in the past and tolerated the procedures well.      PAST MEDICAL & SURGICAL HISTORY:  Hypertension    Other specified diseases of upper respiratory tract    Stridor    Latex allergy    Tracheal/bronchial disease    Benign neoplasm of larynx    Voice disturbance    Obesity    Larynx disorder  Stenosis of larynx    History of bronchoscopy    Larynx disorder  Laser surgery 04/2010, 02/2012, 02/2013, 4/2015, 1/2018  Balloon dilation 11/2018        Review of Systems:   CONSTITUTIONAL: No fever, weight loss, or fatigue  EYES: No eye pain, visual disturbances, or discharge  ENMT:  see HPI  NECK: see HPI  BREASTS: No pain, masses, or nipple discharge  RESPIRATORY: see HPI  CARDIOVASCULAR: No chest pain, palpitations, dizziness, or leg swelling  GASTROINTESTINAL: No abdominal or epigastric pain. No nausea, vomiting, or hematemesis; No diarrhea or constipation. No melena or hematochezia.  GENITOURINARY: No dysuria, frequency, hematuria, or incontinence  NEUROLOGICAL: No headaches, memory loss, loss of strength, numbness, or tremors  SKIN: No itching, burning, rashes, or lesions   LYMPH NODES: No enlarged glands  ENDOCRINE: No heat or cold intolerance; No hair loss  MUSCULOSKELETAL: No joint pain or swelling; No muscle, back, or extremity pain  PSYCHIATRIC: No depression, anxiety, mood swings, or difficulty sleeping  HEME/LYMPH: No easy bruising, or bleeding gums  ALLERY AND IMMUNOLOGIC: No hives or eczema    Allergies    latex (Other)  No Known Drug Allergies    Intolerances        Social History: denies etoh tob idu    FAMILY HISTORY:  Family history of leukemia (Sibling)  sister    Family history of thyroid cancer     Noncontributory    MEDICATIONS  (STANDING):  buDESOnide    Inhalation Suspension 0.5 milliGRAM(s) Inhalation two times a day  dexAMETHasone  IVPB 8 milliGRAM(s) IV Intermittent every 8 hours  dextrose 5% + sodium chloride 0.45%. 1000 milliLiter(s) (70 mL/Hr) IV Continuous <Continuous>  famotidine Injectable 40 milliGRAM(s) IV Push daily  heparin   Injectable 5000 Unit(s) SubCutaneous every 12 hours  hydrochlorothiazide 12.5 milliGRAM(s) Oral daily  influenza   Vaccine 0.5 milliLiter(s) IntraMuscular once  losartan 100 milliGRAM(s) Oral daily    MEDICATIONS  (PRN):      Vital Signs Last 24 Hrs  T(C): 36.7 (22 Oct 2020 10:00), Max: 37.1 (21 Oct 2020 17:26)  T(F): 98 (22 Oct 2020 10:00), Max: 98.7 (21 Oct 2020 17:26)  HR: 73 (22 Oct 2020 10:00) (70 - 103)  BP: 151/79 (22 Oct 2020 10:00) (138/94 - 157/90)  BP(mean): --  RR: 14 (22 Oct 2020 10:00) (13 - 18)  SpO2: 97% (22 Oct 2020 10:00) (97% - 100%)  CAPILLARY BLOOD GLUCOSE            PHYSICAL EXAM:  GENERAL: NAD, well-developed  HEAD:  Atraumatic, Normocephalic  EYES: EOMI, PERRLA, conjunctiva and sclera clear  NECK: Supple, No JVD  CHEST/LUNG: Clear to auscultation bilaterally; audible stridor without stethoscope  HEART: Regular rate and rhythm; No murmurs, rubs, or gallops  ABDOMEN: Soft, Nontender, Nondistended; Bowel sounds present  EXTREMITIES:  2+ Peripheral Pulses, No clubbing, cyanosis, or edema  PSYCH: AAOx3  NEUROLOGY: non-focal  SKIN: No rashes or lesions    LABS:                        13.7   10.07 )-----------( 644      ( 21 Oct 2020 15:21 )             42.8     10-21    142  |  97<L>  |  9   ----------------------------<  122<H>  3.6   |  32<H>  |  0.99    Ca    10.7<H>      21 Oct 2020 15:21  Phos  2.8     10-21  Mg     2.0     10-21    TPro  7.3  /  Alb  4.7  /  TBili  0.6  /  DBili  x   /  AST  27  /  ALT  41<H>  /  AlkPhos  136<H>  10-21    PT/INR - ( 21 Oct 2020 15:21 )   PT: 12.6 SEC;   INR: 1.11          PTT - ( 21 Oct 2020 15:21 )  PTT:39.2 SEC          Care Discussed with Consultants/Other Providers: ENT

## 2020-10-22 NOTE — CONSULT NOTE ADULT - ASSESSMENT
64F hx subglottic stenosis, HTN admitted from Dr Hernandez's office with recurrence of subglottic stenosis with dyspnea and stridor, dilatation is planned

## 2020-10-22 NOTE — PATIENT PROFILE ADULT - VISION (WITH CORRECTIVE LENSES IF THE PATIENT USUALLY WEARS THEM):
Partially impaired: cannot see medication labels or newsprint, but can see obstacles in path, and the surrounding layout; can count fingers at arm's length/wears glasses, patient has them at bedside

## 2020-10-22 NOTE — CONSULT NOTE ADULT - PROBLEM SELECTOR RECOMMENDATION 2
Patient is at low risk for cardiopulmonary complications of this low risk procedure.  Pt is medically optimized and may proceed to OR without further testing.

## 2020-10-22 NOTE — PROGRESS NOTE ADULT - SUBJECTIVE AND OBJECTIVE BOX
STATUS POST:  64y Female hx SGS p/w SOB 10/21, for OR tomorrow w angeles. NAEON    Vital Signs Last 24 Hrs  T(C): 36.4 (22 Oct 2020 06:41), Max: 37.1 (21 Oct 2020 17:26)  T(F): 97.6 (22 Oct 2020 06:41), Max: 98.7 (21 Oct 2020 17:26)  HR: 75 (22 Oct 2020 06:41) (71 - 103)  BP: 140/71 (22 Oct 2020 06:41) (138/94 - 157/90)  BP(mean): --  RR: 15 (22 Oct 2020 06:41) (13 - 18)  SpO2: 97% (22 Oct 2020 06:41) (97% - 100%)    General: AAOx3, resting in bed, comfortable  C/V: NSR RRR  Pulm: Nonlabored breathing, no respiratory distress. hoarse voice. biphasic stridor, suprasternal retraction    A/P: 64y Female hx SGS p/w SOB 10/21  - for OR tomorrow w angeles  - continuous pulse ox   - NPO   - IVF  - Budesonide 0.5mg/2ml via neb BID   - Famotidine 40mg PO daily  - Decadron 8mg stat then Q8 hours for 24 hours  - Heliox PRN for stridor.   - Oxygen PRN to keep SpO2 >92%    ----------------------------------------------  Gael Puri MD  Resident  Department of Otolaryngology - Head and Neck Surgery  Adult Page #[phone]  Peds Page #[phone]

## 2020-10-23 LAB
ANION GAP SERPL CALC-SCNC: 11 MMO/L — SIGNIFICANT CHANGE UP (ref 7–14)
BUN SERPL-MCNC: 20 MG/DL — SIGNIFICANT CHANGE UP (ref 7–23)
CALCIUM SERPL-MCNC: 10.3 MG/DL — SIGNIFICANT CHANGE UP (ref 8.4–10.5)
CHLORIDE SERPL-SCNC: 99 MMOL/L — SIGNIFICANT CHANGE UP (ref 98–107)
CO2 SERPL-SCNC: 29 MMOL/L — SIGNIFICANT CHANGE UP (ref 22–31)
CREAT SERPL-MCNC: 1.13 MG/DL — SIGNIFICANT CHANGE UP (ref 0.5–1.3)
GLUCOSE SERPL-MCNC: 196 MG/DL — HIGH (ref 70–99)
MAGNESIUM SERPL-MCNC: 2.2 MG/DL — SIGNIFICANT CHANGE UP (ref 1.6–2.6)
PHOSPHATE SERPL-MCNC: 3.4 MG/DL — SIGNIFICANT CHANGE UP (ref 2.5–4.5)
POTASSIUM SERPL-MCNC: 3.7 MMOL/L — SIGNIFICANT CHANGE UP (ref 3.5–5.3)
POTASSIUM SERPL-SCNC: 3.7 MMOL/L — SIGNIFICANT CHANGE UP (ref 3.5–5.3)
SODIUM SERPL-SCNC: 139 MMOL/L — SIGNIFICANT CHANGE UP (ref 135–145)

## 2020-10-23 PROCEDURE — 31641 BRONCHOSCOPY TREAT BLOCKAGE: CPT

## 2020-10-23 PROCEDURE — 31541 LARYNSCOP W/TUMR EXC + SCOPE: CPT | Mod: 59

## 2020-10-23 RX ORDER — POTASSIUM CHLORIDE 20 MEQ
10 PACKET (EA) ORAL ONCE
Refills: 0 | Status: COMPLETED | OUTPATIENT
Start: 2020-10-23 | End: 2020-10-23

## 2020-10-23 RX ORDER — OXYCODONE HYDROCHLORIDE 5 MG/1
5 TABLET ORAL EVERY 6 HOURS
Refills: 0 | Status: DISCONTINUED | OUTPATIENT
Start: 2020-10-23 | End: 2020-10-24

## 2020-10-23 RX ORDER — SENNA PLUS 8.6 MG/1
2 TABLET ORAL AT BEDTIME
Refills: 0 | Status: DISCONTINUED | OUTPATIENT
Start: 2020-10-23 | End: 2020-10-24

## 2020-10-23 RX ORDER — ACETAMINOPHEN 500 MG
650 TABLET ORAL EVERY 6 HOURS
Refills: 0 | Status: DISCONTINUED | OUTPATIENT
Start: 2020-10-23 | End: 2020-10-24

## 2020-10-23 RX ORDER — IBUPROFEN 200 MG
400 TABLET ORAL EVERY 6 HOURS
Refills: 0 | Status: DISCONTINUED | OUTPATIENT
Start: 2020-10-23 | End: 2020-10-24

## 2020-10-23 RX ADMIN — Medication 101.6 MILLIGRAM(S): at 05:05

## 2020-10-23 RX ADMIN — FAMOTIDINE 40 MILLIGRAM(S): 10 INJECTION INTRAVENOUS at 17:05

## 2020-10-23 RX ADMIN — Medication 100 MILLIEQUIVALENT(S): at 17:03

## 2020-10-23 RX ADMIN — Medication 12.5 MILLIGRAM(S): at 05:05

## 2020-10-23 RX ADMIN — HEPARIN SODIUM 5000 UNIT(S): 5000 INJECTION INTRAVENOUS; SUBCUTANEOUS at 05:05

## 2020-10-23 RX ADMIN — Medication 0.5 MILLIGRAM(S): at 21:14

## 2020-10-23 RX ADMIN — HEPARIN SODIUM 5000 UNIT(S): 5000 INJECTION INTRAVENOUS; SUBCUTANEOUS at 17:05

## 2020-10-23 RX ADMIN — LOSARTAN POTASSIUM 100 MILLIGRAM(S): 100 TABLET, FILM COATED ORAL at 05:05

## 2020-10-23 NOTE — BRIEF OPERATIVE NOTE - OPERATION/FINDINGS
flexible laryngoscopy and bronchoscopy, resection and ablation with balloon dilation of subglottic stenosis

## 2020-10-23 NOTE — PROGRESS NOTE ADULT - SUBJECTIVE AND OBJECTIVE BOX
No issues overnight    Vital Signs Last 24 Hrs  T(C): 36.3 (23 Oct 2020 05:10), Max: 36.9 (22 Oct 2020 21:50)  T(F): 97.4 (23 Oct 2020 05:10), Max: 98.5 (22 Oct 2020 21:50)  HR: 64 (23 Oct 2020 05:10) (64 - 88)  BP: 114/70 (23 Oct 2020 05:10) (114/70 - 151/79)  BP(mean): --  RR: 18 (23 Oct 2020 05:10) (14 - 20)  SpO2: 95% (23 Oct 2020 05:10) (93% - 98%)    General: AAOx3, resting in bed, comfortable  C/V: NSR RRR  Pulm: Nonlabored breathing, no respiratory distress. hoarse voice. biphasic stridor, suprasternal retraction    A/P: 64y Female hx SGS p/w SOB 10/21  - OR today, added on   - f/u OR  - NPO   - IVF  - Budesonide 0.5mg/2ml via neb BID   - Famotidine 40mg PO daily  - Decadron 8mg stat then Q8 hours for 24 hours  - Heliox PRN  - Oxygen PRN to keep SpO2 >92%

## 2020-10-24 ENCOUNTER — TRANSCRIPTION ENCOUNTER (OUTPATIENT)
Age: 65
End: 2020-10-24

## 2020-10-24 VITALS — DIASTOLIC BLOOD PRESSURE: 60 MMHG | SYSTOLIC BLOOD PRESSURE: 106 MMHG

## 2020-10-24 LAB
HCV AB S/CO SERPL IA: 0.07 S/CO — SIGNIFICANT CHANGE UP (ref 0–0.99)
HCV AB SERPL-IMP: SIGNIFICANT CHANGE UP

## 2020-10-24 PROCEDURE — 99232 SBSQ HOSP IP/OBS MODERATE 35: CPT

## 2020-10-24 RX ORDER — BUDESONIDE, MICRONIZED 100 %
1 POWDER (GRAM) MISCELLANEOUS
Qty: 60 | Refills: 0
Start: 2020-10-24 | End: 2020-11-22

## 2020-10-24 RX ORDER — FLUTICASONE PROPIONATE 220 MCG
1 AEROSOL WITH ADAPTER (GRAM) INHALATION
Qty: 1 | Refills: 0
Start: 2020-10-24 | End: 2020-11-02

## 2020-10-24 RX ORDER — ACETAMINOPHEN 500 MG
2 TABLET ORAL
Qty: 0 | Refills: 0 | DISCHARGE
Start: 2020-10-24

## 2020-10-24 RX ORDER — FAMOTIDINE 10 MG/ML
1 INJECTION INTRAVENOUS
Qty: 14 | Refills: 0
Start: 2020-10-24 | End: 2020-11-06

## 2020-10-24 RX ADMIN — HEPARIN SODIUM 5000 UNIT(S): 5000 INJECTION INTRAVENOUS; SUBCUTANEOUS at 06:00

## 2020-10-24 RX ADMIN — FAMOTIDINE 40 MILLIGRAM(S): 10 INJECTION INTRAVENOUS at 13:14

## 2020-10-24 RX ADMIN — LOSARTAN POTASSIUM 100 MILLIGRAM(S): 100 TABLET, FILM COATED ORAL at 06:00

## 2020-10-24 RX ADMIN — Medication 0.5 MILLIGRAM(S): at 08:07

## 2020-10-24 RX ADMIN — Medication 12.5 MILLIGRAM(S): at 05:59

## 2020-10-24 NOTE — DISCHARGE NOTE PROVIDER - NSDCFUADDINST_GEN_ALL_CORE_FT
Voice rest for 5 days  Activity: No heavy lifting or strenuous activity. Walk as tolerated. Physical therapy per routine. Follow up 1 - 2 weeks after discharge. Call office for appointment.  Office: [phone].  Call office for fever >101.5 or shortness of breath.

## 2020-10-24 NOTE — DISCHARGE NOTE NURSING/CASE MANAGEMENT/SOCIAL WORK - PATIENT PORTAL LINK FT
You can access the FollowMyHealth Patient Portal offered by [facility] by registering at the following website: [email or web address]. By joining [facility]’s FollowMyHealth portal, you will also be able to view your health information using other applications (apps) compatible with our system.

## 2020-10-24 NOTE — DISCHARGE NOTE PROVIDER - CARE PROVIDER_API CALL
Anthony Hernandez  OTOLARYNGOLOGY  [facility]  Dept of Otolaryngology, [address] [place] [address]  Phone: [phone]  Fax: [phone]  Follow Up Time:

## 2020-10-24 NOTE — DISCHARGE NOTE PROVIDER - HOSPITAL COURSE
Patient was admitted for management of subglottic stenosis. Had dilation and cauterization of stenosis on 10/23/20. Patient's post-operative course was uncomplicated. Diet was advanced as tolerated to soft diet and pain was well controlled on medication. On day of discharge, pt deemed stable and ready to return home with plan to follow up as an outpatient.

## 2020-10-24 NOTE — DISCHARGE NOTE PROVIDER - NSDCCPCAREPLAN_GEN_ALL_CORE_FT
PRINCIPAL DISCHARGE DIAGNOSIS  Diagnosis: Subglottic stenosis  Assessment and Plan of Treatment:

## 2020-10-24 NOTE — PROGRESS NOTE ADULT - SUBJECTIVE AND OBJECTIVE BOX
ANESTHESIA POSTOP CHECK    64y Female POSTOP DAY 1 S/P flexible laryngoscopy and bronchoscopy, resection and ablation with balloon dilation of subglottic stenosis    Vital Signs Last 24 Hrs  T(C): 36.5 (24 Oct 2020 06:03), Max: 37 (23 Oct 2020 16:10)  T(F): 97.7 (24 Oct 2020 06:03), Max: 98.6 (23 Oct 2020 16:10)  HR: 57 (24 Oct 2020 08:08) (57 - 107)  BP: 125/63 (24 Oct 2020 06:03) (104/52 - 146/72)  BP(mean): 93 (23 Oct 2020 16:10) (61 - 93)  RR: 16 (24 Oct 2020 06:03) (16 - 22)  SpO2: 97% (24 Oct 2020 08:08) (95% - 100%)  I&O's Summary    23 Oct 2020 07:01  -  24 Oct 2020 07:00  --------------------------------------------------------  IN: 660 mL / OUT: 0 mL / NET: 660 mL        [x] NO APPARENT ANESTHESIA COMPLICATIONS

## 2020-10-24 NOTE — PROGRESS NOTE ADULT - ASSESSMENT
64F hx subglottic stenosis, HTN admitted from Dr Hernandez's office with recurrence of subglottic stenosis with dyspnea and stridor, s/p dilatation 10/23

## 2020-10-24 NOTE — DISCHARGE NOTE PROVIDER - NSDCMRMEDTOKEN_GEN_ALL_CORE_FT
acetaminophen 325 mg oral tablet: 2 tab(s) orally every 6 hours, As needed, Mild Pain (1 - 3)  azelastine 137 mcg/inh (0.1%) nasal spray: 2 spray(s) nasal 2 times a day, As Needed  famotidine 20 mg oral tablet: 1 tab(s) orally once a day   losartan-hydrochlorothiazide 100mg-12.5mg oral tablet: 1 tab(s) orally once a day  omeprazole 40 mg oral delayed release capsule: 1 cap(s) orally once a day  Pulmicort Flexhaler 90 mcg/inh inhalation powder: 1 puff(s) inhaled 2 times a day

## 2020-10-24 NOTE — PROGRESS NOTE ADULT - SUBJECTIVE AND OBJECTIVE BOX
No issues overnight, s/p flexible bronch with dilation 10/23. doing well. breathing improved.     Vital Signs Last 24 Hrs  T(C): 36.5 (24 Oct 2020 06:03), Max: 37 (23 Oct 2020 16:10)  T(F): 97.7 (24 Oct 2020 06:03), Max: 98.6 (23 Oct 2020 16:10)  HR: 74 (24 Oct 2020 06:03) (62 - 107)  BP: 125/63 (24 Oct 2020 06:03) (104/52 - 146/72)  BP(mean): 93 (23 Oct 2020 16:10) (61 - 93)  RR: 16 (24 Oct 2020 06:03) (16 - 22)  SpO2: 99% (24 Oct 2020 06:03) (95% - 100%)    General: AAOx3, resting in bed, comfortable  C/V: NSR RRR  Pulm: Nonlabored breathing, no respiratory distress. hoarse voice. biphasic stridor, suprasternal retraction    A/P: 64y Female hx SGS p/w SOB 10/21, s/p flexible bronch with dilation 10/23  - discharge today  - Budesonide 0.5mg/2ml via neb BID   - Famotidine 40mg PO daily  - Heliox PRN   - Oxygen PRN to keep SpO2 >92%

## 2020-10-24 NOTE — PROGRESS NOTE ADULT - SUBJECTIVE AND OBJECTIVE BOX
[facility] Division of Hospital Medicine  Jose Alberto Jaime MD  Pager [phone]    Patient is a 64y old  Female who presents with a chief complaint of Stridor (24 Oct 2020 09:38)      SUBJECTIVE / OVERNIGHT EVENTS: tolerated dilatation 10/23, , reports large improvement in ability to breathe, d/c is planned      MEDICATIONS  (STANDING):  buDESOnide    Inhalation Suspension 0.5 milliGRAM(s) Inhalation two times a day  famotidine Injectable 40 milliGRAM(s) IV Push daily  heparin   Injectable 5000 Unit(s) SubCutaneous every 12 hours  hydrochlorothiazide 12.5 milliGRAM(s) Oral daily  losartan 100 milliGRAM(s) Oral daily  senna 2 Tablet(s) Oral at bedtime    MEDICATIONS  (PRN):  acetaminophen   Tablet .. 650 milliGRAM(s) Oral every 6 hours PRN Mild Pain (1 - 3)  ibuprofen  Tablet. 400 milliGRAM(s) Oral every 6 hours PRN Moderate Pain (4 - 6)  oxyCODONE    IR 5 milliGRAM(s) Oral every 6 hours PRN Severe Pain (7 - 10)      CAPILLARY BLOOD GLUCOSE        I&O's Summary    23 Oct 2020 07:01  -  24 Oct 2020 07:00  --------------------------------------------------------  IN: 660 mL / OUT: 0 mL / NET: 660 mL    24 Oct 2020 07:01  -  24 Oct 2020 15:08  --------------------------------------------------------  IN: 400 mL / OUT: 0 mL / NET: 400 mL        PHYSICAL EXAM:  Vital Signs Last 24 Hrs  T(C): 36.5 (24 Oct 2020 10:11), Max: 37 (23 Oct 2020 16:10)  T(F): 97.7 (24 Oct 2020 10:11), Max: 98.6 (23 Oct 2020 16:10)  HR: 67 (24 Oct 2020 10:11) (57 - 107)  BP: 106/60 (24 Oct 2020 10:22) (106/60 - 125/63)  BP(mean): 93 (23 Oct 2020 16:10) (71 - 93)  RR: 17 (24 Oct 2020 10:11) (16 - 20)  SpO2: 98% (24 Oct 2020 10:11) (95% - 99%)  CONSTITUTIONAL: NAD  EYES: EOMI, conjunctiva and sclera clear  ENMT: Moist oral mucosa  NECK: Supple  RESPIRATORY: Breathing unlabored, CTAB, hoarse with upper airway noise transmitted  CARDIOVASCULAR: S1S2 no MRG  ABDOMEN: Nontender to palpation, normoactive bowel sounds, no rebound/guarding  MUSCULOSKELETAL: no clubbing or cyanosis of digits  NEUROLOGY: No focal deficits   SKIN: No rashes or lesions    LABS:    10-23    139  |  99  |  20  ----------------------------<  196<H>  3.7   |  29  |  1.13    Ca    10.3      23 Oct 2020 06:45  Phos  3.4     10-23  Mg     2.2     10-23        COORDINATION OF CARE:  Care Discussed with Consultants/Other Providers [Y/N]: ENT  Prior or Outpatient Records Reviewed [Y/N]:

## 2020-11-19 ENCOUNTER — APPOINTMENT (OUTPATIENT)
Dept: OTOLARYNGOLOGY | Facility: CLINIC | Age: 65
End: 2020-11-19
Payer: MEDICARE

## 2020-11-19 VITALS — SYSTOLIC BLOOD PRESSURE: 124 MMHG | HEART RATE: 71 BPM | DIASTOLIC BLOOD PRESSURE: 81 MMHG

## 2020-11-19 PROBLEM — I10 ESSENTIAL (PRIMARY) HYPERTENSION: Chronic | Status: ACTIVE | Noted: 2020-10-21

## 2020-11-19 PROCEDURE — 31579 LARYNGOSCOPY TELESCOPIC: CPT

## 2020-11-19 PROCEDURE — 99214 OFFICE O/P EST MOD 30 MIN: CPT | Mod: 25

## 2020-11-19 NOTE — PHYSICAL EXAM
[Midline] : trachea located in midline position [Laryngoscopy Performed] : laryngoscopy was performed, see procedure section for findings [Normal] : no rashes [de-identified] : small old stable red hamartoma on TM [de-identified] : moderately raspy and breathy voice, loud biphasic stridor

## 2020-11-19 NOTE — CONSULT LETTER
[Dear  ___] : Dear  [unfilled], [Consult Letter:] : I had the pleasure of evaluating your patient, [unfilled]. [Please see my note below.] : Please see my note below. [Consult Closing:] : Thank you very much for allowing me to participate in the care of this patient.  If you have any questions, please do not hesitate to contact me. [Sincerely,] : Sincerely, [FreeTextEntry2] : Dr Padilla Ley  [FreeTextEntry3] : Anthony Hernandez MD, PhD\par Chief, Division of Laryngology\par Department of Otolaryngology\par [facility] [place]\par Pediatric Otolaryngology, [facility]\par  of Otolaryngology\par [facility] [employer]\par

## 2020-11-19 NOTE — REASON FOR VISIT
[Post-Operative Visit] : a post-operative visit [FreeTextEntry2] : post op Micro direct laryngoscopy with excision of laryngeal stenosis, bronchoscopy with excision of tracheal  stenosis, balloon dilation, and therapeutic steroid injection.\par 10/23/2020

## 2021-02-11 ENCOUNTER — APPOINTMENT (OUTPATIENT)
Dept: OTOLARYNGOLOGY | Facility: CLINIC | Age: 66
End: 2021-02-11
Payer: MEDICARE

## 2021-02-11 VITALS — SYSTOLIC BLOOD PRESSURE: 139 MMHG | DIASTOLIC BLOOD PRESSURE: 80 MMHG

## 2021-02-11 PROCEDURE — 31579 LARYNGOSCOPY TELESCOPIC: CPT

## 2021-02-11 PROCEDURE — 99214 OFFICE O/P EST MOD 30 MIN: CPT | Mod: 25

## 2021-02-11 NOTE — PHYSICAL EXAM
[Midline] : trachea located in midline position [Laryngoscopy Performed] : laryngoscopy was performed, see procedure section for findings [Normal] : no rashes [de-identified] : small old stable red hamartoma on TM [de-identified] : moderately raspy and breathy voice, loud biphasic stridor

## 2021-02-11 NOTE — HISTORY OF PRESENT ILLNESS
[de-identified] : 64yo F here for post op Micro direct laryngoscopy with excision of laryngeal stenosis, bronchoscopy with excision of tracheal  stenosis, balloon dilation, and therapeutic steroid injection 10/23/2020. Recovery went well. No bleeding. Breathing significantly improved. Voice is stable , comes and goes. More hoarse today because she is talking more to her niece who lives with her now. Doing budesonide BID. Taking omeprazole in AM and famotidine PM. \par \par \par

## 2021-02-11 NOTE — CONSULT LETTER
[Dear  ___] : Dear  [unfilled], [Consult Letter:] : I had the pleasure of evaluating your patient, [unfilled]. [Please see my note below.] : Please see my note below. [Consult Closing:] : Thank you very much for allowing me to participate in the care of this patient.  If you have any questions, please do not hesitate to contact me. [Sincerely,] : Sincerely, [FreeTextEntry2] : Dr Padilla Ley  [FreeTextEntry3] : Anthony Hernandez MD, PhD\par Chief, Division of Laryngology\par Department of Otolaryngology\par [facility]\par Pediatric Otolaryngology, [facility]\par  of Otolaryngology\par [facility] [employer]\par

## 2021-03-30 ENCOUNTER — RX RENEWAL (OUTPATIENT)
Age: 66
End: 2021-03-30

## 2021-04-20 ENCOUNTER — APPOINTMENT (OUTPATIENT)
Age: 66
End: 2021-04-20
Payer: MEDICARE

## 2021-04-20 PROCEDURE — 0011A: CPT

## 2021-05-18 ENCOUNTER — APPOINTMENT (OUTPATIENT)
Age: 66
End: 2021-05-18
Payer: MEDICARE

## 2021-05-18 PROCEDURE — 0012A: CPT

## 2021-06-17 ENCOUNTER — APPOINTMENT (OUTPATIENT)
Dept: OTOLARYNGOLOGY | Facility: CLINIC | Age: 66
End: 2021-06-17
Payer: MEDICARE

## 2021-06-17 VITALS
HEART RATE: 65 BPM | BODY MASS INDEX: 34.96 KG/M2 | SYSTOLIC BLOOD PRESSURE: 144 MMHG | HEIGHT: 62 IN | WEIGHT: 190 LBS | DIASTOLIC BLOOD PRESSURE: 85 MMHG

## 2021-06-17 PROCEDURE — 99214 OFFICE O/P EST MOD 30 MIN: CPT | Mod: 25

## 2021-06-17 PROCEDURE — 31579 LARYNGOSCOPY TELESCOPIC: CPT

## 2021-06-17 NOTE — PHYSICAL EXAM
[de-identified] : small old stable red hamartoma on TM [Midline] : trachea located in midline position [Laryngoscopy Performed] : laryngoscopy was performed, see procedure section for findings [Normal] : no rashes [de-identified] : moderately raspy and breathy voice, mild biphasic stridor

## 2021-06-17 NOTE — HISTORY OF PRESENT ILLNESS
[de-identified] : 65 year old female with history of laryngotracheal stenosis, s/p micro direct laryngoscopy with excision of laryngeal stenosis, bronchoscopy with excision of tracheal stenosis, balloon dilation, and therapeutic steroid injection 10/23/2020. Breathing significantly improved. Voice is stable, comes and goes, more hoarse today. Denies dysphagia, odynophagia. Doing budesonide BID, needs refill. Taking omeprazole in AM and famotidine PM. Notes ankle edema new. Couldn't see hematologist because getting new windows.\par \par

## 2021-06-17 NOTE — CONSULT LETTER
[Dear  ___] : Dear  [unfilled], [Consult Letter:] : I had the pleasure of evaluating your patient, [unfilled]. [Please see my note below.] : Please see my note below. [Consult Closing:] : Thank you very much for allowing me to participate in the care of this patient.  If you have any questions, please do not hesitate to contact me. [Sincerely,] : Sincerely, [FreeTextEntry2] : Dr Padilla Ley  [FreeTextEntry3] : Anthony Hernandez MD, PhD\par Chief, Division of Laryngology\par Department of Otolaryngology\par [facility]\par Pediatric Otolaryngology, [facility]\par  of Otolaryngology\par [facility] [employer]\par

## 2021-07-22 RX ORDER — LOSARTAN POTASSIUM 100 MG/1
TABLET, FILM COATED ORAL
Refills: 0 | Status: DISCONTINUED | COMMUNITY
End: 2021-07-22

## 2021-07-30 ENCOUNTER — APPOINTMENT (OUTPATIENT)
Dept: FAMILY MEDICINE | Facility: CLINIC | Age: 66
End: 2021-07-30
Payer: MEDICARE

## 2021-07-30 VITALS
HEIGHT: 62 IN | TEMPERATURE: 96.6 F | DIASTOLIC BLOOD PRESSURE: 62 MMHG | WEIGHT: 191.8 LBS | HEART RATE: 78 BPM | OXYGEN SATURATION: 99 % | RESPIRATION RATE: 18 BRPM | SYSTOLIC BLOOD PRESSURE: 114 MMHG | BODY MASS INDEX: 35.3 KG/M2

## 2021-07-30 PROCEDURE — 99213 OFFICE O/P EST LOW 20 MIN: CPT

## 2021-07-30 RX ORDER — NYSTATIN 100000 [USP'U]/ML
100000 SUSPENSION ORAL 3 TIMES DAILY
Qty: 2 | Refills: 2 | Status: DISCONTINUED | COMMUNITY
Start: 2018-11-28 | End: 2021-07-30

## 2021-07-30 RX ORDER — CHOLECALCIFEROL (VITAMIN D3) 125 MCG
TABLET ORAL
Refills: 0 | Status: DISCONTINUED | COMMUNITY
End: 2021-07-30

## 2021-07-30 RX ORDER — METHYLPREDNISOLONE 4 MG/1
4 TABLET ORAL
Refills: 0 | Status: COMPLETED | COMMUNITY
End: 2021-07-30

## 2021-07-30 RX ORDER — METHYLPREDNISOLONE 4 MG/1
4 TABLET ORAL
Qty: 1 | Refills: 1 | Status: COMPLETED | COMMUNITY
Start: 2020-09-10 | End: 2021-07-30

## 2021-07-30 RX ORDER — FLUTICASONE PROPIONATE 50 UG/1
50 SPRAY, METERED NASAL
Qty: 1 | Refills: 2 | Status: DISCONTINUED | COMMUNITY
Start: 2019-05-16 | End: 2021-07-30

## 2021-07-30 RX ORDER — AZELASTINE HYDROCHLORIDE 137 UG/1
0.1 SPRAY, METERED NASAL TWICE DAILY
Qty: 1 | Refills: 3 | Status: DISCONTINUED | COMMUNITY
Start: 2019-08-29 | End: 2021-07-30

## 2021-07-30 NOTE — REVIEW OF SYSTEMS
[Chest Pain] : no chest pain [Shortness Of Breath] : no shortness of breath [Negative] : Constitutional

## 2021-09-03 ENCOUNTER — RX RENEWAL (OUTPATIENT)
Age: 66
End: 2021-09-03

## 2021-09-30 ENCOUNTER — APPOINTMENT (OUTPATIENT)
Dept: OTOLARYNGOLOGY | Facility: CLINIC | Age: 66
End: 2021-09-30
Payer: MEDICARE

## 2021-09-30 VITALS
SYSTOLIC BLOOD PRESSURE: 143 MMHG | HEIGHT: 62 IN | DIASTOLIC BLOOD PRESSURE: 82 MMHG | WEIGHT: 190 LBS | BODY MASS INDEX: 34.96 KG/M2 | HEART RATE: 64 BPM

## 2021-09-30 PROCEDURE — 99214 OFFICE O/P EST MOD 30 MIN: CPT | Mod: 25

## 2021-09-30 PROCEDURE — 31579 LARYNGOSCOPY TELESCOPIC: CPT

## 2021-09-30 NOTE — HISTORY OF PRESENT ILLNESS
[de-identified] : 65 year old female with history of laryngotracheal stenosis, s/p micro direct laryngoscopy with excision of laryngeal stenosis, bronchoscopy with excision of tracheal stenosis, balloon dilation, and therapeutic steroid injection 10/23/2020. States completed steroids. States breathing well, stable since last visit. Voice is stable, comes and goes, more hoarse today. Denies dysphagia, odynophagia. Doing budesonide BID, needs refill. Taking omeprazole in AM and famotidine PM. \par

## 2021-09-30 NOTE — REASON FOR VISIT
[Subsequent Evaluation] : a subsequent evaluation for [Family Member] : family member [FreeTextEntry2] : laryngotracheal stenosis.

## 2021-09-30 NOTE — PHYSICAL EXAM
[de-identified] : small old stable red hamartoma on TM [Midline] : trachea located in midline position [Laryngoscopy Performed] : laryngoscopy was performed, see procedure section for findings [Normal] : no rashes [de-identified] : moderately raspy and breathy voice, mild biphasic stridor

## 2021-09-30 NOTE — CONSULT LETTER
[Dear  ___] : Dear  [unfilled], [Consult Letter:] : I had the pleasure of evaluating your patient, [unfilled]. [Please see my note below.] : Please see my note below. [Consult Closing:] : Thank you very much for allowing me to participate in the care of this patient.  If you have any questions, please do not hesitate to contact me. [Sincerely,] : Sincerely, [FreeTextEntry2] : Dr Padilla Ley \par  [FreeTextEntry3] : Anthony Hernandez MD, PhD\par Chief, Division of Laryngology\par Department of Otolaryngology\par [facility]\par Pediatric Otolaryngology, [facility]\par  of Otolaryngology\par [facility] [employer]\par

## 2021-10-25 ENCOUNTER — RX RENEWAL (OUTPATIENT)
Age: 66
End: 2021-10-25

## 2021-11-01 ENCOUNTER — RX RENEWAL (OUTPATIENT)
Age: 66
End: 2021-11-01

## 2021-11-17 ENCOUNTER — APPOINTMENT (OUTPATIENT)
Dept: FAMILY MEDICINE | Facility: CLINIC | Age: 66
End: 2021-11-17
Payer: MEDICARE

## 2021-11-17 VITALS
TEMPERATURE: 96.5 F | BODY MASS INDEX: 34.96 KG/M2 | SYSTOLIC BLOOD PRESSURE: 130 MMHG | RESPIRATION RATE: 18 BRPM | WEIGHT: 190 LBS | HEART RATE: 73 BPM | HEIGHT: 62 IN | OXYGEN SATURATION: 82 % | DIASTOLIC BLOOD PRESSURE: 80 MMHG

## 2021-11-17 PROCEDURE — 99214 OFFICE O/P EST MOD 30 MIN: CPT | Mod: 25

## 2021-11-17 PROCEDURE — 36415 COLL VENOUS BLD VENIPUNCTURE: CPT

## 2021-11-17 NOTE — PLAN
[FreeTextEntry1] : 65-year-old female presents for medication renewal and fasting blood work\par Respiratory insufficiency/chronic vocal cord paralysis/tracheitis–she chooses not to use budesonide on inhalation suspension she feels that her respiratory status is appropriate her O2 sat today is 98%\par Hypertension–controlled with losartan/HCTZ her blood pressure today is 130/80\par Fasting blood work is drawn to evaluate functioning\par Vitamin D deficiency–vitamin D supplementation 50,000 units weekly is renewed

## 2021-11-17 NOTE — HEALTH RISK ASSESSMENT
[0] : 2) Feeling down, depressed, or hopeless: Not at all (0) [PHQ-2 Negative - No further assessment needed] : PHQ-2 Negative - No further assessment needed [[id number]] : 0

## 2021-11-17 NOTE — HISTORY OF PRESENT ILLNESS
[FreeTextEntry1] : Medication renewals [de-identified] : 65-year-old female presents for renewal of medication\par History of hypertension on losartan/HCTZ 1 tablet daily\par She suffers from vitamin D deficiency\par O2 saturation monitored at 98%

## 2021-11-18 LAB
25(OH)D3 SERPL-MCNC: 43.7 NG/ML
ALBUMIN SERPL ELPH-MCNC: 4.7 G/DL
ALP BLD-CCNC: 170 U/L
ALT SERPL-CCNC: 33 U/L
ANION GAP SERPL CALC-SCNC: 20 MMOL/L
AST SERPL-CCNC: 26 U/L
BASOPHILS # BLD AUTO: 0.07 K/UL
BASOPHILS NFR BLD AUTO: 0.6 %
BILIRUB SERPL-MCNC: 0.4 MG/DL
BUN SERPL-MCNC: 16 MG/DL
CALCIUM SERPL-MCNC: 10.8 MG/DL
CHLORIDE SERPL-SCNC: 99 MMOL/L
CHOLEST SERPL-MCNC: 209 MG/DL
CO2 SERPL-SCNC: 20 MMOL/L
CREAT SERPL-MCNC: 1.16 MG/DL
EOSINOPHIL # BLD AUTO: 0.14 K/UL
EOSINOPHIL NFR BLD AUTO: 1.3 %
ESTIMATED AVERAGE GLUCOSE: 131 MG/DL
GLUCOSE SERPL-MCNC: 88 MG/DL
HBA1C MFR BLD HPLC: 6.2 %
HCT VFR BLD CALC: 39.1 %
HDLC SERPL-MCNC: 50 MG/DL
HGB BLD-MCNC: 12.5 G/DL
IMM GRANULOCYTES NFR BLD AUTO: 0.5 %
LDLC SERPL CALC-MCNC: 135 MG/DL
LYMPHOCYTES # BLD AUTO: 2.08 K/UL
LYMPHOCYTES NFR BLD AUTO: 18.7 %
MAN DIFF?: NORMAL
MCHC RBC-ENTMCNC: 28 PG
MCHC RBC-ENTMCNC: 32 GM/DL
MCV RBC AUTO: 87.7 FL
MONOCYTES # BLD AUTO: 0.62 K/UL
MONOCYTES NFR BLD AUTO: 5.6 %
NEUTROPHILS # BLD AUTO: 8.15 K/UL
NEUTROPHILS NFR BLD AUTO: 73.3 %
NONHDLC SERPL-MCNC: 159 MG/DL
PLATELET # BLD AUTO: 568 K/UL
POTASSIUM SERPL-SCNC: 4 MMOL/L
PROT SERPL-MCNC: 7.3 G/DL
RBC # BLD: 4.46 M/UL
RBC # FLD: 14.7 %
SODIUM SERPL-SCNC: 140 MMOL/L
TRIGL SERPL-MCNC: 121 MG/DL
TSH SERPL-ACNC: 2.2 UIU/ML
WBC # FLD AUTO: 11.11 K/UL

## 2021-12-02 ENCOUNTER — RX RENEWAL (OUTPATIENT)
Age: 66
End: 2021-12-02

## 2022-04-13 ENCOUNTER — TRANSCRIPTION ENCOUNTER (OUTPATIENT)
Age: 67
End: 2022-04-13

## 2022-05-05 ENCOUNTER — RX RENEWAL (OUTPATIENT)
Age: 67
End: 2022-05-05

## 2022-05-27 ENCOUNTER — APPOINTMENT (OUTPATIENT)
Dept: FAMILY MEDICINE | Facility: CLINIC | Age: 67
End: 2022-05-27
Payer: MEDICARE

## 2022-05-27 VITALS
WEIGHT: 184 LBS | DIASTOLIC BLOOD PRESSURE: 78 MMHG | HEIGHT: 61 IN | OXYGEN SATURATION: 100 % | SYSTOLIC BLOOD PRESSURE: 122 MMHG | HEART RATE: 78 BPM | TEMPERATURE: 97.2 F | BODY MASS INDEX: 34.74 KG/M2

## 2022-05-27 DIAGNOSIS — U07.1 COVID-19: ICD-10-CM

## 2022-05-27 PROCEDURE — 99214 OFFICE O/P EST MOD 30 MIN: CPT | Mod: CS

## 2022-05-27 NOTE — PLAN
[FreeTextEntry1] : 66-year-old female presents for evaluation\par COVID–she had the disease 2 weeks ago.  She stayed in the house and recuperated well but she has a persistent cough.  Her lungs are clear and she is prescribed Phenergan with codeine 6 ounces 10 cc every 4 hours as needed\par Laryngeal stridor–audible upper airway wheeze but not affecting the lung or CO2 concentration.\par Her O2 sat is 100% despite her wheeze\par Hypertension–blood pressure 122/78.  She is controlled with losartan/HCTZ 12/12 100 mg daily\par Vitamin D deficiency–50,000 units weekly

## 2022-06-29 ENCOUNTER — APPOINTMENT (OUTPATIENT)
Dept: OTOLARYNGOLOGY | Facility: CLINIC | Age: 67
End: 2022-06-29

## 2022-06-29 VITALS — DIASTOLIC BLOOD PRESSURE: 71 MMHG | SYSTOLIC BLOOD PRESSURE: 128 MMHG | HEART RATE: 68 BPM | OXYGEN SATURATION: 100 %

## 2022-06-29 DIAGNOSIS — R06.1 STRIDOR: ICD-10-CM

## 2022-06-29 PROCEDURE — 31579 LARYNGOSCOPY TELESCOPIC: CPT

## 2022-06-29 PROCEDURE — 99214 OFFICE O/P EST MOD 30 MIN: CPT | Mod: 25

## 2022-06-29 NOTE — CONSULT LETTER
[Dear  ___] : Dear  [unfilled], [Consult Letter:] : I had the pleasure of evaluating your patient, [unfilled]. [Please see my note below.] : Please see my note below. [Consult Closing:] : Thank you very much for allowing me to participate in the care of this patient.  If you have any questions, please do not hesitate to contact me. [Sincerely,] : Sincerely, [FreeTextEntry2] : Dr Padilla Ley \par  [FreeTextEntry3] : Anthony Hernandez MD, PhD\par Chief, Division of Laryngology\par Department of Otolaryngology\par [facility]\par Pediatric Otolaryngology, [facility]\par  of Otolaryngology\par [facility] [employer]\par

## 2022-06-29 NOTE — PATIENT PROFILE ADULT - FLU SEASON?
Pediatric Infectious Diseases Clinic Note    Abe is accompanied by Mother.  Discussion with mother performed with assistance of ipad .      Chief Complaint   Patient presents with   • Follow-up   • Office Visit   Septic prepatellar bursitis    HPI:  Abe is a 5 year old boy who was admitted from 6/20-6/21 with septic prepatellar bursitis. He had an I&D on 6/20. Treated with clindamycin.  Cultures ultimately grew clindamycin-sensitive MSSA.     Since discharge, Abe has done well.  Minimal to no pain. Has been walking with a support brace but was told by ortho could stop when ready. No fevers.  No problems taking the medication.  No n/v/d.    ROS:  Constitutional: No fever  Respiratory: No cough or SOB  GI: No nausea, vomiting, diarrhea, or abdominal pain  : No hematuria or dysuria  MSK: Minimal pain L knee  Skin: No rash  Immuno: No known immunodeficiencies  All other systems negative.    Patient's medications, allergies, past medical, surgical, social and family histories were reviewed and updated as appropriate.    Physical Examination:  Gen: Well appearing, no acute distress  Resp: Lungs CTAB, BS equal, non-labored breathing  CV: Regular rate and rhythm, no murmurs, rubs or gallops  MSK: L knee with small scab.  No swelling.  No tenderness.  Full ROM.  Neuro: Acts appropriate for age, no focal deficits    Vitals:    06/28/22 1013   BP: (!) 125/70   BP Location: LUE - Left upper extremity   Patient Position: Sitting   Cuff Size: Small Adult   Pulse: 84   Resp: 22   Temp: 97.5 °F (36.4 °C)   TempSrc: Temporal   Weight: 24.3 kg (53 lb 10.9 oz)   Height: 3' 11.84\" (1.215 m)   PainSc:  0       Review:  All laboratory and radiology reviewed.    Assessment/Plan:  Abe is a 4 yo boy admitted with prepatellar septic bursitis, now s/p I&D on 6/20.  He is doing very well.  No evidence of active infection at this time.  He should complete his prescribed course of clindamycin.    Abe can follow-up with ID  on an as needed basis going forward.    Plan discussed with family and any questions they had were answered. Family is to call the [facility] ID clinic with questions at [phone].    Roger Roberts MD, PhD  Pediatric Infectious Diseases    Total Visit Time: 20 minutes       Yes...

## 2022-06-29 NOTE — PHYSICAL EXAM
[Midline] : trachea located in midline position [Laryngoscopy Performed] : laryngoscopy was performed, see procedure section for findings [Normal] : no rashes [de-identified] : small old stable red hamartoma on TM [de-identified] : moderately raspy and breathy voice, mild biphasic stridor

## 2022-06-29 NOTE — HISTORY OF PRESENT ILLNESS
[de-identified] : 66 year old female with history of laryngotracheal stenosis, s/p micro direct laryngoscopy with excision of laryngeal stenosis, bronchoscopy with excision of tracheal stenosis, balloon dilation, and therapeutic steroid injection 10/23/2020.  \par Had Covid last month, had the infusion. Did not need to go to the hospital. Doing well today but breathing worse since covid\par States since November has noticeable shortness of breath when walking, but doing well today\par States voice has been stable until getting Covid last month, and since then has been breathy\par Denies dysphagia, odynophagia. \par Currently not doing the budesonide, will put in a refill today. \par needs refill. Taking omeprazole in AM and famotidine PM. \par With niece today\par Patient denies otalgia, otorrhea, ear infections, hearing loss, tinnitus, dizziness, vertigo, headaches related to hearing.\par No recent blood work done recently.  \par \par \par

## 2022-07-05 DIAGNOSIS — Z01.818 ENCOUNTER FOR OTHER PREPROCEDURAL EXAMINATION: ICD-10-CM

## 2022-08-15 ENCOUNTER — OUTPATIENT (OUTPATIENT)
Dept: OUTPATIENT SERVICES | Facility: HOSPITAL | Age: 67
LOS: 1 days | End: 2022-08-15

## 2022-08-15 VITALS
HEART RATE: 71 BPM | DIASTOLIC BLOOD PRESSURE: 76 MMHG | WEIGHT: 188.05 LBS | RESPIRATION RATE: 16 BRPM | OXYGEN SATURATION: 99 % | TEMPERATURE: 98 F | HEIGHT: 61 IN | SYSTOLIC BLOOD PRESSURE: 127 MMHG

## 2022-08-15 DIAGNOSIS — Z98.89 OTHER SPECIFIED POSTPROCEDURAL STATES: Chronic | ICD-10-CM

## 2022-08-15 DIAGNOSIS — Z91.040 LATEX ALLERGY STATUS: ICD-10-CM

## 2022-08-15 DIAGNOSIS — J38.6 STENOSIS OF LARYNX: ICD-10-CM

## 2022-08-15 DIAGNOSIS — Z98.890 OTHER SPECIFIED POSTPROCEDURAL STATES: Chronic | ICD-10-CM

## 2022-08-15 DIAGNOSIS — J38.7 OTHER DISEASES OF LARYNX: Chronic | ICD-10-CM

## 2022-08-15 DIAGNOSIS — J39.8 OTHER SPECIFIED DISEASES OF UPPER RESPIRATORY TRACT: ICD-10-CM

## 2022-08-15 LAB
ANION GAP SERPL CALC-SCNC: 11 MMOL/L — SIGNIFICANT CHANGE UP (ref 7–14)
BUN SERPL-MCNC: 15 MG/DL — SIGNIFICANT CHANGE UP (ref 7–23)
CALCIUM SERPL-MCNC: 10.5 MG/DL — SIGNIFICANT CHANGE UP (ref 8.4–10.5)
CHLORIDE SERPL-SCNC: 101 MMOL/L — SIGNIFICANT CHANGE UP (ref 98–107)
CO2 SERPL-SCNC: 29 MMOL/L — SIGNIFICANT CHANGE UP (ref 22–31)
CREAT SERPL-MCNC: 1.12 MG/DL — SIGNIFICANT CHANGE UP (ref 0.5–1.3)
EGFR: 54 ML/MIN/1.73M2 — LOW
GLUCOSE SERPL-MCNC: 112 MG/DL — HIGH (ref 70–99)
HCT VFR BLD CALC: 34.4 % — LOW (ref 34.5–45)
HGB BLD-MCNC: 11.5 G/DL — SIGNIFICANT CHANGE UP (ref 11.5–15.5)
MCHC RBC-ENTMCNC: 28 PG — SIGNIFICANT CHANGE UP (ref 27–34)
MCHC RBC-ENTMCNC: 33.4 GM/DL — SIGNIFICANT CHANGE UP (ref 32–36)
MCV RBC AUTO: 83.9 FL — SIGNIFICANT CHANGE UP (ref 80–100)
NRBC # BLD: 0 /100 WBCS — SIGNIFICANT CHANGE UP
NRBC # FLD: 0 K/UL — SIGNIFICANT CHANGE UP
PLATELET # BLD AUTO: 563 K/UL — HIGH (ref 150–400)
POTASSIUM SERPL-MCNC: 3.3 MMOL/L — LOW (ref 3.5–5.3)
POTASSIUM SERPL-SCNC: 3.3 MMOL/L — LOW (ref 3.5–5.3)
RBC # BLD: 4.1 M/UL — SIGNIFICANT CHANGE UP (ref 3.8–5.2)
RBC # FLD: 15.2 % — HIGH (ref 10.3–14.5)
SODIUM SERPL-SCNC: 141 MMOL/L — SIGNIFICANT CHANGE UP (ref 135–145)
WBC # BLD: 9.48 K/UL — SIGNIFICANT CHANGE UP (ref 3.8–10.5)
WBC # FLD AUTO: 9.48 K/UL — SIGNIFICANT CHANGE UP (ref 3.8–10.5)

## 2022-08-15 PROCEDURE — 93010 ELECTROCARDIOGRAM REPORT: CPT

## 2022-08-15 RX ORDER — SODIUM CHLORIDE 9 MG/ML
1000 INJECTION, SOLUTION INTRAVENOUS
Refills: 0 | Status: DISCONTINUED | OUTPATIENT
Start: 2022-08-29 | End: 2022-08-30

## 2022-08-15 RX ORDER — BUDESONIDE, MICRONIZED 100 %
2 POWDER (GRAM) MISCELLANEOUS
Qty: 0 | Refills: 0 | DISCHARGE

## 2022-08-15 RX ORDER — AZELASTINE 137 UG/1
2 SPRAY, METERED NASAL
Qty: 0 | Refills: 0 | DISCHARGE

## 2022-08-15 RX ORDER — LOSARTAN/HYDROCHLOROTHIAZIDE 100MG-25MG
1 TABLET ORAL
Qty: 0 | Refills: 0 | DISCHARGE

## 2022-08-15 RX ORDER — OMEPRAZOLE 10 MG/1
1 CAPSULE, DELAYED RELEASE ORAL
Qty: 0 | Refills: 0 | DISCHARGE

## 2022-08-15 NOTE — H&P PST ADULT - NEGATIVE GASTROINTESTINAL SYMPTOMS
no nausea/no vomiting/no diarrhea/no constipation/no change in bowel habits/no abdominal pain/no melena no nausea/no vomiting/no diarrhea/no constipation/no change in bowel habits/no abdominal pain/no melena/no jaundice/no hiccoughs

## 2022-08-15 NOTE — H&P PST ADULT - AIRWAY
Mr Troy called with concerns of fluttering in his chest a couple days ago.  He was wanting to know if there has been any abnormal readings come across on his defibrillator.  No alerts received.  Offered to have him send in a manual reading and he is at work.  He said he feels fine it just worried him.  He has had any fluttering yesterday or today.  He will call if he continues to have these episodes.   normal

## 2022-08-15 NOTE — H&P PST ADULT - NEGATIVE ENMT SYMPTOMS
no hearing difficulty/no ear pain/no tinnitus/no vertigo/no nose bleeds/no gum bleeding/no throat pain/no dysphagia

## 2022-08-15 NOTE — H&P PST ADULT - NSICDXPASTSURGICALHX_GEN_ALL_CORE_FT
PAST SURGICAL HISTORY:  History of bronchoscopy     Larynx disorder Laser surgery 04/2010, 02/2012, 02/2013, 4/2015, 1/2018  Balloon dilation 11/2018 PAST SURGICAL HISTORY:  History of bronchoscopy     History of laryngoscopy bronchoscopy with excision stenosis;  12/09/2019    Larynx disorder Laser surgery 04/2010, 02/2012, 02/2013, 4/2015, 1/2018  Balloon dilation 11/2018

## 2022-08-15 NOTE — H&P PST ADULT - NSICDXPASTMEDICALHX_GEN_ALL_CORE_FT
PAST MEDICAL HISTORY:  Benign neoplasm of larynx     Larynx disorder Stenosis of larynx    Latex allergy     Obesity     Other specified diseases of upper respiratory tract     Stridor     Tracheal/bronchial disease     Voice disturbance PAST MEDICAL HISTORY:  Benign neoplasm of larynx     Hypertension     Larynx disorder Stenosis of larynx    Latex allergy     Obesity     Other specified diseases of upper respiratory tract     Stridor     Tracheal/bronchial disease     Voice disturbance

## 2022-08-15 NOTE — H&P PST ADULT - NSICDXFAMILYHX_GEN_ALL_CORE_FT
FAMILY HISTORY:  Family history of thyroid cancer    Sibling  Still living? No  Family history of leukemia, Age at diagnosis: Age Unknown FAMILY HISTORY:  Family history of thyroid cancer    Sibling  Still living? No  Family history of leukemia, Age at diagnosis: Age Unknown

## 2022-08-15 NOTE — H&P PST ADULT - NEGATIVE LYMPHATIC SYMPTOMS
no enlarged lymph nodes/no tender lymph nodes no enlarged lymph nodes/no tender lymph nodes/no swelling of extremity

## 2022-08-15 NOTE — H&P PST ADULT - NEGATIVE MUSCULOSKELETAL SYMPTOMS
no arthralgia/no arthritis/no myalgia/no muscle cramps/no muscle weakness/no stiffness/no neck pain/no arm pain L/no arm pain R/no back pain/no leg pain L/no leg pain R

## 2022-08-15 NOTE — H&P PST ADULT - NS HEP C RISK YEAR OPTION
1/11/2021    Rogelio Park is a 11 year old male who was seen for medication management.This visit was conducted over the video as a result of the COVID-19 pandemic. This patient verbally consents to a video visit. Patient identifies as Rogelio Park and reports he is currently located at home.     The video-only visit was being conducted for the purpose of providing treatment advice during a public health emergency. The treatment advice that was being provided was based on what was reported by the patient. Without the patient being seen and evaluated in person, there would be some risk that the information and/or assessment provided by the [facility] provider might be incomplete or inaccurate.Total time spent was 30 minutes.  Chief Complaint:  Eating disorder, anxiety    Diagnosis:  Anorexia nervosa restricting type  Anxiety disorder unspecified type  Rule out depression unspecified      Medications:  Current Outpatient Medications   Medication Sig Dispense Refill   • fluoxetine (PROzac) 40 MG capsule Take 1 capsule by mouth daily. 30 capsule 5   • CALCIUM CITRATE-VITAMIN D PO      • Pediatric Multivit-Minerals-C (KIDS GUMMY BEAR VITAMINS PO) Take  by mouth.       No current facility-administered medications for this visit.        No major side effects.    Allergies:  ALLERGIES:  No Known Allergies      There were no vitals filed for this visit.      Subjective:  History was obtained from patient and parents.  Chart was reviewed.  Patient reported he has been doing much better.  He reported he has been doing well academically.  He also talked about his dietary habits.  He reported he has been still afraid of eating some of the foods.  He reported he gets anxious if he eats those food.  He identified his fear of food which causes anxiety.  He reported he likes his routine for his dietary habits.  He has limited choices.  He gets anxious if he eats anything different or out of his normal routine.  Patient reported  around Thanksgiving time and Christmas time he was quite nervous.  He has been working with his dietitian on a regular basis.  He has been working with his therapist Dr. Fisher but now he is about to graduate from psychotherapy.  Patient reported medication has been helping him.  He has been taking it regularly.  Patient's perspective of improvement is somewhat different than father's perspective but for the most part patient has been doing better.  He has been sleeping better.   Patient has been seeing therapist  once a month.  Patient denied any concern related to persistent sadness of mood, crying spells, sense of hopelessness or worthlessness.  Patient is not consistent in taking nighttime medication.  Patient denied any safety concerns.  Patient has been taking his medication consistently.        REVIEW OF SYSTEMS:   Psychiatric:  No history suggestive of psychosis.  No history suggestive of manic episode.  No history suggestive of illicit drug use.  Constitutional:  Weight loss  []       Weight Gain   []  No change  []   Neurological:  Headache - Yes [] No    [x]                             Rigidity - Yes [] No [x]                                 Spasticity - Yes   []  No  [x]   Gastrointestinal - Nausea  Yes  []   No  [x]          Stomach upset  Yes []     No  [x]    All other systems reviewed and are negative  Comprehensive Past/Family/Social history which was performed during initial evaluation was reexamined and reviewed with patient/family.  There is nothing new to add today.  For details, please refer to my initial evaluation note in this chart.  Family Psychiatric History Diagnosis/Medications AODA   Mother:       [x]  Yes  []  No History of anxiety disorder, ?OCD []  Yes  [x]  No   Father:        []  Yes  [x]  No  []  Yes  [x]  No   Siblings:      []  Yes  [x]  No  []  Yes  [x]  No   Extended Family  [x]  Yes [x]  No Mother's cousin  has history of eating disorder []  Yes  [x]  No     Psychosocial  History: Patient is living with biological parents, 12 and 15 years old sisters . Pt is in the 5th grade. Patient [] does [x] does not have IEP (individualized education program). Patient [] had to [x] never had to repeat any grades.      Mental Status Examination:  Casually dressed, healthy looking, [x] well [] poorly groomed   [] old, [x] boy [] girl [] adolescent boy [] adolescent girl showed   [x] good [] partial [] no interest in interview.    Eye to eye contact was [x] maintained, [] not maintained, [] partially maintained.  Rapport established.      Mood was [] euthymic, []depressed, [] irritable, [x] anxious, [] angry, [] euphoric,   [] empty, [] guilty, [] perplexed.    It was [x] consistent, [] fluctuating, [] alternating rapidly between extremes during the interview.    Affect was [x] full, [] constricted, [] blunted, [] flat in range and [] congruent,   [] not congruent with mood.    Speech was [x] spontaneous, [] not spontaneous    Rate and rhythm was [x] regular, [] slow, [] pressured, [] hesitant, [] emotional,   [] dramatic, [] loud, [] monotonous, [] whispered, [] slurred.    Attention and concentration was [x] good, [] poor, [] impaired.    Thought process was [x] logical and coherent, [] illogical, [] incomprehensible.    Rate of thoughts was [x] normal, [] slow, [] hesitant, [] rapid, [] poverty of ideas,   [] overabundance of ideas, [] racing, [] flights of ideas, [] thought blocking.    Associations of thoughts:  [x] Intact, [] loose, [] circumstantial, [] tangential,   [] word salad, [] neologisms.    Thought content:    Delusions  Yes  []    No  [x]     Obsessions  Yes  []    No  [x]     Hallucinations  Yes  []    No  [x]     Tics  Yes  []    No  [x]     Suicidal ideations:  [x] none, [] passive, [] present with plan    Alert and oriented x3.  Memory-[x] immediate, [x]recent, [x] remote-intact.  Language fluent.    Judgment was [x] fair, [] poor, [] impaired.    Insight was [] good, [x]  limited, [] poor.    Fund of knowledge was [x] good, [] limited, [] poor        Treatment Intervention:  Patient and family agreed with above-mentioned medication management.  We decided to modify patient's medication regimen.  We talked about possibility of lowering the dose of fluoxetine in future.  We will follow up with the patient again in 4 months.  Patient will continue to work with dietitian.  If needed patient will continue to also see his therapist on intermittent basis.  No other new concerns.  We do believe patient's anxiety is major concern and contributed to patient's eating disorder.  We also believe patient's rigidity around that has also contributed to it  Anorexia nervosa restricting type-ongoing concern her  Anxiety disorder unspecified type-ongoing concern   Patient and family agreed with treatment plan.  Collaborate with therapist   []   Collaborate with school  []     Labs ordered   []   Labs reviewed   []     I will follow up with patient in 3 months. If any safety concern arises, patient/family will call 911, go to ER, or nearby hospital.  Patient/family can call my office for any question or concerns during regular business hours.    Eloy Hinkle MD   Patient Refused

## 2022-08-15 NOTE — H&P PST ADULT - NEGATIVE BREAST SYMPTOMS
no breast tenderness L/no breast tenderness R No no breast tenderness L/no breast tenderness R/no breast lump L/no breast lump R

## 2022-08-15 NOTE — H&P PST ADULT - RESPIRATORY
no respiratory distress/no use of accessory muscles/airway patent/breath sounds equal/respirations non-labored/no intercostal retractions/stridor

## 2022-08-15 NOTE — H&P PST ADULT - NEGATIVE GENERAL GENITOURINARY SYMPTOMS
no hematuria/no flank pain L/no flank pain R/no bladder infections/no incontinence/no dysuria/no urinary hesitancy/normal urinary frequency no hematuria/no renal colic/no flank pain L/no flank pain R/no bladder infections/no incontinence/no dysuria/no urinary hesitancy/normal urinary frequency

## 2022-08-15 NOTE — H&P PST ADULT - MUSCULOSKELETAL
details… normal/ROM intact/no joint erythema/no joint warmth/normal gait/strength 5/5 bilateral upper extremities/strength 5/5 bilateral lower extremities/no chest wall tenderness

## 2022-08-15 NOTE — H&P PST ADULT - NEUROLOGICAL
details… normal/cranial nerves II-XII intact/sensation intact/responds to pain/responds to verbal commands

## 2022-08-15 NOTE — H&P PST ADULT - HISTORY OF PRESENT ILLNESS
67 y/o female     presents to PST for preoperative evaluation with dx of stenosis of larynx t.  h/o tracheal/ subglottic stenosis since 2010. s/p multiple laryngoscopies with  laser/dilation procedures since diagnosis with most recent one 11/6/2018.  Pt reports dyspnea that has been worsening over the past month, was seen by ENT and scheduled for procedure.  Pt is scheduled for microdirect laryngoscopy and bronchoscopy with excision stenosis on 12/9/19.    67 y/o female with H/O     presents to PST for preoperative evaluation with dx of stenosis of larynx t.  h/o tracheal/ subglottic stenosis since 2010. s/p multiple laryngoscopies with  laser/dilation procedures since diagnosis with most recent one 11/6/2018.      Pt reports dyspnea that has been worsening over the past month, was seen by ENT and scheduled for procedure.  Pt is scheduled for microdirect laryngoscopy and bronchoscopy with excision stenosis on 12/9/19.      65 y/o female with H/O: HTN presents to PST for preoperative evaluation with pre op dx of stenosis of larynx. S/P multiple laryngoscopies with  laser/dilation procedures since diagnosis in 2010. S/P microdirect laryngoscopy and bronchoscopy with excision stenosis on 12/9/19. Schedule for microdirect laryngoscopy and bronchoscopy with excision stenosis

## 2022-08-15 NOTE — H&P PST ADULT - NEGATIVE CARDIOVASCULAR SYMPTOMS
no chest pain/no palpitations/no orthopnea/no paroxysmal nocturnal dyspnea/no peripheral edema/no claudication no chest pain/no palpitations/no dyspnea on exertion/no orthopnea/no paroxysmal nocturnal dyspnea/no peripheral edema/no claudication

## 2022-08-15 NOTE — H&P PST ADULT - PROBLEM SELECTOR PLAN 1
Schedule for microdirect laryngoscopy and bronchoscopy with excision stenosis tentatively on 08/29/2022. Pre op instructions given and explained. Pt verbalized understanding.  Covid-19 PCR test ordered Schedule for microdirect laryngoscopy and bronchoscopy with excision stenosis tentatively on 08/29/2022. Pre op instructions given and explained. Pt verbalized understanding.  Case discussed with Dr. Weiss  Covid-19 PCR test ordered

## 2022-08-15 NOTE — H&P PST ADULT - NEGATIVE PSYCHIATRIC SYMPTOMS
no suicidal ideation/no anxiety/no insomnia/no memory loss/no visual hallucinations/no auditory hallucinations no suicidal ideation/no depression/no anxiety/no insomnia/no memory loss/no visual hallucinations/no auditory hallucinations

## 2022-08-17 NOTE — CONSULT NOTE ADULT - PROBLEM/RECOMMENDATION-3
----- Message from Arthur Valverde MD sent at 8/17/2022  1:00 PM CDT -----  Message sent to patient via EHR portal [employer].     Yancy,   Your urine is suspicious for an infection.  The culture is still pending, it usually takes 2 days to result.     Take care,   Arthur Valverde MD     
Called patient  And left a VM informing her Your urine is suspicious for an infection.  The culture is still pending, it usually takes 2 days to result.  
DISPLAY PLAN FREE TEXT

## 2022-08-19 ENCOUNTER — APPOINTMENT (OUTPATIENT)
Dept: FAMILY MEDICINE | Facility: CLINIC | Age: 67
End: 2022-08-19

## 2022-08-19 VITALS
OXYGEN SATURATION: 98 % | HEIGHT: 60 IN | HEART RATE: 77 BPM | RESPIRATION RATE: 15 BRPM | WEIGHT: 181.38 LBS | TEMPERATURE: 96.7 F | DIASTOLIC BLOOD PRESSURE: 72 MMHG | SYSTOLIC BLOOD PRESSURE: 120 MMHG | BODY MASS INDEX: 35.61 KG/M2

## 2022-08-19 PROCEDURE — 99214 OFFICE O/P EST MOD 30 MIN: CPT

## 2022-08-19 NOTE — PLAN
[FreeTextEntry1] : 66-year-old female presents for medication renewal\par Hypertension–120/72 controlled on losartan–HCTZ, 100–12 0.5\par Vitamin D deficiency–50,000 international units on a weekly basis\par Tracheal stenosis/vocal cord paralysis–followed by ENT scheduled for dilation in the hospital to be performed in 10 days

## 2022-08-19 NOTE — HISTORY OF PRESENT ILLNESS
[FreeTextEntry1] : Med renewals [de-identified] : 66-year-old female presents for medication renewal.  She is scheduled to have surgical intervention in 10 days for vocal cord stenosis

## 2022-08-22 ENCOUNTER — RX RENEWAL (OUTPATIENT)
Age: 67
End: 2022-08-22

## 2022-08-26 NOTE — ASU PATIENT PROFILE, ADULT - NS TRANSFER PROSTHESIS:
Problem: Pancreatitis  Goal: *Control of acute pain  Outcome: Progressing Towards Goal   Pt given prn dilaudid for 8/10 pain with movement. Pt plan for MRI tonight. Pt educated on plan of care. 2318- pt down to MRI for MRCP    Bedside shift change report given to Joana Coreas and Mo, RNs (oncoming nurse) by Elva Sheehan RN (offgoing nurse). Report included the following information SBAR, Kardex, ED Summary, Intake/Output, MAR, Accordion, Recent Results, Med Rec Status, Cardiac Rhythm ST and Alarm Parameters . none

## 2022-08-26 NOTE — ASU PATIENT PROFILE, ADULT - NSICDXPASTSURGICALHX_GEN_ALL_CORE_FT
PAST SURGICAL HISTORY:  History of bronchoscopy     History of laryngoscopy bronchoscopy with excision stenosis;  12/09/2019    Larynx disorder Laser surgery 04/2010, 02/2012, 02/2013, 4/2015, 1/2018  Balloon dilation 11/2018

## 2022-08-26 NOTE — ASU PATIENT PROFILE, ADULT - FALL HARM RISK - UNIVERSAL INTERVENTIONS
Bed in lowest position, wheels locked, appropriate side rails in place/Call bell, personal items and telephone in reach/Instruct patient to call for assistance before getting out of bed or chair/Non-slip footwear when patient is out of bed/[place] to call system/Physically safe environment - no spills, clutter or unnecessary equipment/Purposeful Proactive Rounding/Room/bathroom lighting operational, light cord in reach

## 2022-08-28 ENCOUNTER — TRANSCRIPTION ENCOUNTER (OUTPATIENT)
Age: 67
End: 2022-08-28

## 2022-08-29 ENCOUNTER — INPATIENT (INPATIENT)
Facility: HOSPITAL | Age: 67
LOS: 0 days | Discharge: HOME | End: 2022-08-30
Attending: OTOLARYNGOLOGY | Admitting: OTOLARYNGOLOGY

## 2022-08-29 ENCOUNTER — TRANSCRIPTION ENCOUNTER (OUTPATIENT)
Age: 67
End: 2022-08-29

## 2022-08-29 ENCOUNTER — APPOINTMENT (OUTPATIENT)
Dept: OTOLARYNGOLOGY | Facility: HOSPITAL | Age: 67
End: 2022-08-29

## 2022-08-29 VITALS
RESPIRATION RATE: 16 BRPM | SYSTOLIC BLOOD PRESSURE: 115 MMHG | TEMPERATURE: 99 F | OXYGEN SATURATION: 100 % | DIASTOLIC BLOOD PRESSURE: 49 MMHG | HEART RATE: 68 BPM | WEIGHT: 181 LBS | HEIGHT: 60 IN

## 2022-08-29 DIAGNOSIS — J38.7 OTHER DISEASES OF LARYNX: Chronic | ICD-10-CM

## 2022-08-29 DIAGNOSIS — J38.6 STENOSIS OF LARYNX: ICD-10-CM

## 2022-08-29 DIAGNOSIS — Z98.89 OTHER SPECIFIED POSTPROCEDURAL STATES: Chronic | ICD-10-CM

## 2022-08-29 DIAGNOSIS — Z98.890 OTHER SPECIFIED POSTPROCEDURAL STATES: Chronic | ICD-10-CM

## 2022-08-29 DEVICE — GEL PROLARYN PLUS 1 CC SYR W TRANS ORAL NDL: Type: IMPLANTABLE DEVICE | Status: FUNCTIONAL

## 2022-08-29 DEVICE — BLLN CRE PULM 15 THRU 18MM X 5.5CM: Type: IMPLANTABLE DEVICE | Status: FUNCTIONAL

## 2022-08-29 DEVICE — BLLN CRE 12 MM: Type: IMPLANTABLE DEVICE | Status: FUNCTIONAL

## 2022-08-29 RX ORDER — HYDROMORPHONE HYDROCHLORIDE 2 MG/ML
0.5 INJECTION INTRAMUSCULAR; INTRAVENOUS; SUBCUTANEOUS
Refills: 0 | Status: DISCONTINUED | OUTPATIENT
Start: 2022-08-29 | End: 2022-08-29

## 2022-08-29 RX ORDER — ONDANSETRON 8 MG/1
4 TABLET, FILM COATED ORAL ONCE
Refills: 0 | Status: COMPLETED | OUTPATIENT
Start: 2022-08-29 | End: 2022-08-29

## 2022-08-29 RX ORDER — FAMOTIDINE 10 MG/ML
40 INJECTION INTRAVENOUS AT BEDTIME
Refills: 0 | Status: DISCONTINUED | OUTPATIENT
Start: 2022-08-29 | End: 2022-08-30

## 2022-08-29 RX ORDER — LOSARTAN POTASSIUM 100 MG/1
100 TABLET, FILM COATED ORAL DAILY
Refills: 0 | Status: DISCONTINUED | OUTPATIENT
Start: 2022-08-29 | End: 2022-08-30

## 2022-08-29 RX ORDER — PANTOPRAZOLE SODIUM 20 MG/1
40 TABLET, DELAYED RELEASE ORAL
Refills: 0 | Status: DISCONTINUED | OUTPATIENT
Start: 2022-08-29 | End: 2022-08-30

## 2022-08-29 RX ORDER — HYDROCHLOROTHIAZIDE 25 MG
12.5 TABLET ORAL DAILY
Refills: 0 | Status: DISCONTINUED | OUTPATIENT
Start: 2022-08-29 | End: 2022-08-30

## 2022-08-29 RX ORDER — ERGOCALCIFEROL 1.25 MG/1
50000 CAPSULE ORAL
Refills: 0 | Status: DISCONTINUED | OUTPATIENT
Start: 2022-08-29 | End: 2022-08-30

## 2022-08-29 RX ORDER — FAMOTIDINE 10 MG/ML
40 INJECTION INTRAVENOUS AT BEDTIME
Refills: 0 | Status: DISCONTINUED | OUTPATIENT
Start: 2022-08-29 | End: 2022-08-29

## 2022-08-29 RX ORDER — FENTANYL CITRATE 50 UG/ML
25 INJECTION INTRAVENOUS
Refills: 0 | Status: DISCONTINUED | OUTPATIENT
Start: 2022-08-29 | End: 2022-08-29

## 2022-08-29 RX ORDER — OXYCODONE HYDROCHLORIDE 5 MG/1
5 TABLET ORAL EVERY 6 HOURS
Refills: 0 | Status: DISCONTINUED | OUTPATIENT
Start: 2022-08-29 | End: 2022-08-30

## 2022-08-29 RX ORDER — BUDESONIDE, MICRONIZED 100 %
0.25 POWDER (GRAM) MISCELLANEOUS
Refills: 0 | Status: DISCONTINUED | OUTPATIENT
Start: 2022-08-29 | End: 2022-08-30

## 2022-08-29 RX ORDER — IBUPROFEN 200 MG
400 TABLET ORAL EVERY 6 HOURS
Refills: 0 | Status: DISCONTINUED | OUTPATIENT
Start: 2022-08-29 | End: 2022-08-30

## 2022-08-29 RX ORDER — ACETAMINOPHEN 500 MG
650 TABLET ORAL EVERY 6 HOURS
Refills: 0 | Status: DISCONTINUED | OUTPATIENT
Start: 2022-08-29 | End: 2022-08-30

## 2022-08-29 RX ADMIN — SODIUM CHLORIDE 30 MILLILITER(S): 9 INJECTION, SOLUTION INTRAVENOUS at 15:45

## 2022-08-29 RX ADMIN — Medication 650 MILLIGRAM(S): at 23:39

## 2022-08-29 RX ADMIN — ONDANSETRON 4 MILLIGRAM(S): 8 TABLET, FILM COATED ORAL at 16:00

## 2022-08-29 RX ADMIN — FAMOTIDINE 40 MILLIGRAM(S): 10 INJECTION INTRAVENOUS at 21:33

## 2022-08-29 RX ADMIN — Medication 650 MILLIGRAM(S): at 21:33

## 2022-08-29 NOTE — ASU DISCHARGE PLAN (ADULT/PEDIATRIC) - PROCEDURE
direct laryngoscopy, supraglottoplasty, botox injection of vocal cords bilaterally, nasal endoscopy with removal of cyst

## 2022-08-29 NOTE — DISCHARGE NOTE PROVIDER - NSDCMRMEDTOKEN_GEN_ALL_CORE_FT
budesonide 0.25 mg/2 mL inhalation suspension: 2 milliliter(s) inhaled 2 times a day  famotidine 40 mg oral tablet: 1 tab(s) orally once a day (at bedtime)  losartan-hydrochlorothiazide 100 mg-12.5 mg oral tablet: 1 tab(s) orally once a day AM  omeprazole 40 mg oral delayed release capsule: 1 cap(s) orally once a day AM  Vitamin D2 1.25 mg (50,000 intl units) oral capsule: 1 cap(s) orally once a week

## 2022-08-29 NOTE — ASU DISCHARGE PLAN (ADULT/PEDIATRIC) - NS MD DC FALL RISK RISK
For information on Fall & Injury Prevention, visit: [email or web address] OR  [email or web address] OR  https://www.cdc.gov/steadi/patient.html

## 2022-08-29 NOTE — PATIENT PROFILE ADULT - MONEY FOR FOOD
Pt is a 34yo  @[id number] presenting in labor with precipitous delivery. PNC complicated by GDMA1. GBS negative.     OBHx:  2014: , A1, 36wk, 5.5lb  2015: , A1, term, 7lb  2017: , A1, term, 7lb  2019: , A1, 36wk, 2750g  GYNHx: no hx of STIs; Paps up to date and normal, no fibroids, no cysts  PMHx: Denies  PSHx: Denies  Meds: None  All: NKDA  SocHx: no tobacco, alcohol, recreational drug use  FHx: denies hx breast, ovarian, colon CA    VS last 24 hours:  VS  T(C): --  HR: 82 (19 @ 19:48)  BP: 113/57 (19 @ 19:41)  RR: --  SpO2: 100% (19 @ 19:43)    Abd: Fundus firm  : No lacs    Pt is a 34yo  @30kbo6y presenting in labor with precipitous delivery.  -Admit to L&D  -Routine labs    Dr. Miko Garibay PGY1
no

## 2022-08-29 NOTE — BRIEF OPERATIVE NOTE - NSICDXBRIEFPROCEDURE_GEN_ALL_CORE_FT
PROCEDURES:  Bronchoscopy, flexible 29-Aug-2022 15:23:12  Nino Chatman  Excision, tissue, for subglottic stenosis, endoscopic 29-Aug-2022 15:23:26  Nino Chatman  Dilation of subglottic stenosis 29-Aug-2022 15:24:12  Nino Chatman  
PROCEDURES:  Laryngoscopy, direct, with vocal cord injection 29-Aug-2022 13:35:30  Nino Chatman  Nasal endoscopy with debridement 29-Aug-2022 13:35:54  Nino Chatman  Laryngoscopy with supraglottoplasty 29-Aug-2022 13:36:06  Nino Chatman

## 2022-08-29 NOTE — DISCHARGE NOTE PROVIDER - HOSPITAL COURSE
Patient is a 66 year old female with history of subglottic stenosis who underwent flexible bronchoscopy, excision of subglottic stenosis, balloon dilation of subglottic stenosis, steroid injection of subglottic stenosis, and transcervical right vocal cord injection augmentation. Patient's post-operative course was uncomplicated. Diet was advanced as tolerated and pain was well controlled on medication. On day of discharge, patient had stable vital signs, was tolerating diet, voiding without assistance, and pain was controlled.

## 2022-08-29 NOTE — DISCHARGE NOTE PROVIDER - NSDCFUADDINST_GEN_ALL_CORE_FT
- Continue all home medications. No changes were made.  - For pain, you may use over the counter medications such as acetaminophen (Tylenol) and ibuprofen (Advil and Motrin)  - Resume a normal diet.   - Take it easy for at least 7 days. Do only light activity/exercise during this time.   - Follow up with Dr. Hernandez. Call the office to make a follow up appointment.

## 2022-08-29 NOTE — PATIENT PROFILE ADULT - FALL HARM RISK - HARM RISK INTERVENTIONS

## 2022-08-29 NOTE — DISCHARGE NOTE PROVIDER - NSDCFUSCHEDAPPT_GEN_ALL_CORE_FT
[facility]  [facility] [address] [facility]  Scheduled Appointment: 09/28/2022

## 2022-08-29 NOTE — DISCHARGE NOTE PROVIDER - NSDCCPCAREPLAN_GEN_ALL_CORE_FT
PRINCIPAL DISCHARGE DIAGNOSIS  Diagnosis: Subglottic stenosis  Assessment and Plan of Treatment:        PRINCIPAL DISCHARGE DIAGNOSIS  Diagnosis: Subglottic stenosis  Assessment and Plan of Treatment: follow up outpatient

## 2022-08-29 NOTE — DISCHARGE NOTE PROVIDER - CARE PROVIDER_API CALL
[facility]  OTOLARYNGOLOGY  [id number] [address] [place] [address]  Phone: [phone]  Fax: [phone]  Follow Up Time:

## 2022-08-29 NOTE — BRIEF OPERATIVE NOTE - OPERATION/FINDINGS
Anterior and right shelves of subglottic stenosis, s/p excision, balloon dilation, steroid injection.
Tight AE folds s/p supraglottoplasty. Small cyst medial to R eustachian tube orifice.

## 2022-08-30 ENCOUNTER — TRANSCRIPTION ENCOUNTER (OUTPATIENT)
Age: 67
End: 2022-08-30

## 2022-08-30 ENCOUNTER — NON-APPOINTMENT (OUTPATIENT)
Age: 67
End: 2022-08-30

## 2022-08-30 VITALS
DIASTOLIC BLOOD PRESSURE: 58 MMHG | RESPIRATION RATE: 18 BRPM | HEART RATE: 66 BPM | OXYGEN SATURATION: 99 % | SYSTOLIC BLOOD PRESSURE: 119 MMHG | TEMPERATURE: 97 F

## 2022-08-30 LAB — SARS-COV-2 N GENE NPH QL NAA+PROBE: NOT DETECTED

## 2022-08-30 RX ORDER — BUDESONIDE, MICRONIZED 100 %
2 POWDER (GRAM) MISCELLANEOUS
Qty: 48 | Refills: 0
Start: 2022-08-30 | End: 2022-09-10

## 2022-08-30 RX ORDER — OXYCODONE HYDROCHLORIDE 5 MG/1
1 TABLET ORAL
Qty: 0 | Refills: 0 | DISCHARGE
Start: 2022-08-30

## 2022-08-30 RX ORDER — BUDESONIDE, MICRONIZED 100 %
4 POWDER (GRAM) MISCELLANEOUS
Qty: 48 | Refills: 0 | DISCHARGE
Start: 2022-08-30 | End: 2022-09-10

## 2022-08-30 RX ORDER — ACETAMINOPHEN 500 MG
2 TABLET ORAL
Qty: 0 | Refills: 0 | DISCHARGE
Start: 2022-08-30

## 2022-08-30 RX ORDER — DEXAMETHASONE 0.5 MG/5ML
10 ELIXIR ORAL ONCE
Refills: 0 | Status: COMPLETED | OUTPATIENT
Start: 2022-08-30 | End: 2022-08-30

## 2022-08-30 RX ORDER — IBUPROFEN 200 MG
1 TABLET ORAL
Qty: 0 | Refills: 0 | DISCHARGE
Start: 2022-08-30

## 2022-08-30 RX ORDER — BUDESONIDE, MICRONIZED 100 %
2 POWDER (GRAM) MISCELLANEOUS
Qty: 0 | Refills: 0 | DISCHARGE

## 2022-08-30 RX ADMIN — Medication 102 MILLIGRAM(S): at 11:38

## 2022-08-30 RX ADMIN — ERGOCALCIFEROL 50000 UNIT(S): 1.25 CAPSULE ORAL at 12:15

## 2022-08-30 RX ADMIN — PANTOPRAZOLE SODIUM 40 MILLIGRAM(S): 20 TABLET, DELAYED RELEASE ORAL at 06:09

## 2022-08-30 RX ADMIN — LOSARTAN POTASSIUM 100 MILLIGRAM(S): 100 TABLET, FILM COATED ORAL at 06:09

## 2022-08-30 RX ADMIN — Medication 0.25 MILLIGRAM(S): at 08:52

## 2022-08-30 RX ADMIN — Medication 12.5 MILLIGRAM(S): at 07:04

## 2022-08-30 NOTE — DISCHARGE NOTE NURSING/CASE MANAGEMENT/SOCIAL WORK - PATIENT PORTAL LINK FT
You can access the FollowMyHealth Patient Portal offered by [facility] by registering at the following website: [email or web address]. By joining [employer]’s FollowMyHealth portal, you will also be able to view your health information using other applications (apps) compatible with our system.

## 2022-08-30 NOTE — PROGRESS NOTE ADULT - ASSESSMENT
66yoF POD1 s/p DLB/stenosis excision doing well.    -Decadron x1 today  -Will likely dc home on budesonide nebs  -D/w Dr. Hernandez

## 2022-08-30 NOTE — DISCHARGE NOTE NURSING/CASE MANAGEMENT/SOCIAL WORK - NSDCVIVACCINE_GEN_ALL_CORE_FT
influenza, injectable, quadrivalent, preservative free; 09-Nov-2018 13:08; Gabriela Montalvo (KENN); [employer]; [id number] (Exp. Date: 30-Jun-2019); IntraMuscular; Deltoid Left.; 0.5 milliLiter(s); VIS (VIS Published: 07-Aug-2015, VIS Presented: 09-Nov-2018);

## 2022-08-30 NOTE — DISCHARGE NOTE NURSING/CASE MANAGEMENT/SOCIAL WORK - NSDCPNINST_GEN_ALL_CORE
Follow up with your doctor.  Call 911 if you feel shortness of breath, severe pain that is not going with prescribe pain medication, if you have fever, chill, and feel distress.   Watch for signs of infection; redness, swelling, fever, chills or heat, report such symptoms to the MD. No driving while taking pain medication, it causes drowsiness & constipation. Drink 6-8 glasses of fluids daily to promote hydration. No heavy lifting, pulling or pushing heavy objects. Follow up with the MD

## 2022-08-30 NOTE — PROGRESS NOTE ADULT - SUBJECTIVE AND OBJECTIVE BOX
Patient seen at bedside. No issues overnight. Breathing fine. Tolerating PO.     ICU Vital Signs Last 24 Hrs  T(C): 36.1 (30 Aug 2022 05:29), Max: 37.3 (29 Aug 2022 12:21)  T(F): 97 (30 Aug 2022 05:29), Max: 99.1 (29 Aug 2022 12:21)  HR: 75 (30 Aug 2022 08:52) (68 - 99)  BP: 113/57 (30 Aug 2022 05:29) (113/57 - 139/50)  BP(mean): 70 (29 Aug 2022 18:00) (69 - 82)  ABP: --  ABP(mean): --  RR: 18 (30 Aug 2022 05:29) (12 - 19)  SpO2: 97% (30 Aug 2022 05:29) (95% - 100%)    O2 Parameters below as of 30 Aug 2022 05:29  Patient On (Oxygen Delivery Method): room air    General: NAD  CN II-XII intact  hoarse voice  satting well on RA  neck, soft flat

## 2022-09-28 ENCOUNTER — APPOINTMENT (OUTPATIENT)
Dept: OTOLARYNGOLOGY | Facility: CLINIC | Age: 67
End: 2022-09-28

## 2022-09-28 VITALS
HEIGHT: 60 IN | WEIGHT: 181 LBS | HEART RATE: 69 BPM | DIASTOLIC BLOOD PRESSURE: 76 MMHG | SYSTOLIC BLOOD PRESSURE: 148 MMHG | BODY MASS INDEX: 35.53 KG/M2

## 2022-09-28 PROCEDURE — 99214 OFFICE O/P EST MOD 30 MIN: CPT | Mod: 25

## 2022-09-28 PROCEDURE — 31579 LARYNGOSCOPY TELESCOPIC: CPT

## 2022-09-28 RX ORDER — METHYLPREDNISOLONE 4 MG/1
4 TABLET ORAL
Qty: 1 | Refills: 1 | Status: COMPLETED | COMMUNITY
Start: 2022-06-29 | End: 2022-09-28

## 2022-09-28 NOTE — REASON FOR VISIT
[Subsequent Evaluation] : a subsequent evaluation for [Family Member] : family member [FreeTextEntry2] : Micro direct laryngoscopy with excision of subglottic stenosis, bronchoscopy with excision of tracheal  stenosis, balloon dilation, therapeutic steroid injection, and injection laryngoplasty 08/29/22

## 2022-09-28 NOTE — HISTORY OF PRESENT ILLNESS
[de-identified] : 66 year old female presents with s/p Micro direct laryngoscopy with excision of subglottic stenosis, bronchoscopy with excision of tracheal  stenosis, balloon dilation, therapeutic steroid injection, and injection laryngoplasty 08/29/22\par States voice has been stable\par Reports breathing is better since the last visit. \par Reports a sore throat and increase of cough but minimal phlegm production. \par Denies dysphagia, odynophagia, bleeding, fevers, chills or infections. \par Continues to take omeprazole in AM and famotidine PM. \par Requesting a refill for budesonide inhaler

## 2022-09-28 NOTE — CONSULT LETTER
[Dear  ___] : Dear  [unfilled], [Sincerely,] : Sincerely, [Consult Letter:] : I had the pleasure of evaluating your patient, [unfilled]. [Please see my note below.] : Please see my note below. [Consult Closing:] : Thank you very much for allowing me to participate in the care of this patient.  If you have any questions, please do not hesitate to contact me. [FreeTextEntry2] : Dr Padilla Ley \par  [FreeTextEntry3] : [facility] MD, PhD\par Chief, Division of Laryngology\par Department of Otolaryngology\par [facility]\par Pediatric Otolaryngology, [facility]\par  of Otolaryngology\par [employer] [facility] [employer]\par

## 2022-09-28 NOTE — PHYSICAL EXAM
[Midline] : trachea located in midline position [Laryngoscopy Performed] : laryngoscopy was performed, see procedure section for findings [Normal] : no rashes [de-identified] : small old stable red hamartoma on TM [de-identified] : moderately raspy and breathy voice, mild biphasic stridor

## 2022-10-04 ENCOUNTER — NON-APPOINTMENT (OUTPATIENT)
Age: 67
End: 2022-10-04

## 2022-11-16 ENCOUNTER — RX RENEWAL (OUTPATIENT)
Age: 67
End: 2022-11-16

## 2022-11-17 ENCOUNTER — APPOINTMENT (OUTPATIENT)
Dept: FAMILY MEDICINE | Facility: CLINIC | Age: 67
End: 2022-11-17

## 2022-11-17 VITALS
WEIGHT: 180 LBS | HEIGHT: 61 IN | SYSTOLIC BLOOD PRESSURE: 118 MMHG | TEMPERATURE: 97.3 F | HEART RATE: 75 BPM | BODY MASS INDEX: 33.99 KG/M2 | DIASTOLIC BLOOD PRESSURE: 66 MMHG | RESPIRATION RATE: 15 BRPM | OXYGEN SATURATION: 99 %

## 2022-11-17 PROCEDURE — 99214 OFFICE O/P EST MOD 30 MIN: CPT

## 2022-11-17 NOTE — PLAN
[FreeTextEntry1] : 66-year-old female presents for medication renewal\par Laryngotracheal stenosis–follows with ENT.  Tracheal stenosis with excision\par Speech therapy 3 days weekly\par Hypertension 118/66\par Losartan/HCTZ\par Vitamin D deficiency–vitamin D supplementation 50,000 units on a weekly basis

## 2022-11-17 NOTE — HEALTH RISK ASSESSMENT
[0] : 2) Feeling down, depressed, or hopeless: Not at all (0) [PHQ-2 Negative - No further assessment needed] : PHQ-2 Negative - No further assessment needed [[id number]] : 0

## 2022-12-02 ENCOUNTER — APPOINTMENT (OUTPATIENT)
Dept: OTOLARYNGOLOGY | Facility: CLINIC | Age: 67
End: 2022-12-02

## 2022-12-02 VITALS — HEART RATE: 73 BPM | DIASTOLIC BLOOD PRESSURE: 102 MMHG | SYSTOLIC BLOOD PRESSURE: 143 MMHG

## 2022-12-02 VITALS — HEIGHT: 61 IN | WEIGHT: 180 LBS | BODY MASS INDEX: 33.99 KG/M2

## 2022-12-02 DIAGNOSIS — J38.3 OTHER DISEASES OF VOCAL CORDS: ICD-10-CM

## 2022-12-02 DIAGNOSIS — R06.89 OTHER ABNORMALITIES OF BREATHING: ICD-10-CM

## 2022-12-02 PROCEDURE — 31579 LARYNGOSCOPY TELESCOPIC: CPT

## 2022-12-02 PROCEDURE — 99214 OFFICE O/P EST MOD 30 MIN: CPT | Mod: 25

## 2022-12-16 NOTE — REASON FOR VISIT
[Subsequent Evaluation] : a subsequent evaluation for [Family Member] : family member [FreeTextEntry2] : Micro direct laryngoscopy with excision of subglottic stenosis, bronchoscopy with excision of tracheal stenosis, balloon dilation, therapeutic steroid injection, and injection laryngoplasty 08/29/22. \par

## 2022-12-16 NOTE — PHYSICAL EXAM
[Midline] : trachea located in midline position [Laryngoscopy Performed] : laryngoscopy was performed, see procedure section for findings [Normal] : no rashes [de-identified] : small old stable red hamartoma on TM [de-identified] : moderately raspy and breathy voice, mild biphasic stridor

## 2022-12-16 NOTE — CONSULT LETTER
[Dear  ___] : Dear  [unfilled], [Consult Letter:] : I had the pleasure of evaluating your patient, [unfilled]. [Please see my note below.] : Please see my note below. [Consult Closing:] : Thank you very much for allowing me to participate in the care of this patient.  If you have any questions, please do not hesitate to contact me. [Sincerely,] : Sincerely, [FreeTextEntry2] : Dr Padilla Ley \par  [FreeTextEntry3] : Anthony [facility] MD, PhD\par Chief, Division of Laryngology\par Department of Otolaryngology\par [facility]\par Pediatric Otolaryngology, [facility]\par  of Otolaryngology\par [facility] [employer]\par

## 2022-12-16 NOTE — HISTORY OF PRESENT ILLNESS
[de-identified] : 66 year old female presents with s/p Micro direct laryngoscopy with excision of subglottic stenosis, bronchoscopy with excision of tracheal stenosis, balloon dilation, therapeutic steroid injection, and injection laryngoplasty 08/29/22\par States voice has been stable, with ups and downs. Most people have told her they hear improvement but some have trouble hearing her. States has been having some neck and shoulder pain when talking for longer periods. Denies losing voice. Reports breathing is better since the last visit.\par Reports a sore throat has improved with occasional mucus in throat with throat clearing. \par Denies dysphagia, odynophagia, bleeding, hemoptysis, fevers, chills or infections. \par Continues to take omeprazole in AM and famotidine PM. \par States budesonide needs prior auth and was unable to receive medication. \par Has been going to the speech therapy, Leatha Joseph speech pathologist, with improvement.

## 2023-02-13 ENCOUNTER — APPOINTMENT (OUTPATIENT)
Dept: FAMILY MEDICINE | Facility: CLINIC | Age: 68
End: 2023-02-13
Payer: MEDICARE

## 2023-02-13 ENCOUNTER — RX RENEWAL (OUTPATIENT)
Age: 68
End: 2023-02-13

## 2023-02-13 VITALS
WEIGHT: 189.01 LBS | TEMPERATURE: 96.8 F | BODY MASS INDEX: 35.68 KG/M2 | HEIGHT: 61 IN | RESPIRATION RATE: 15 BRPM | OXYGEN SATURATION: 99 % | HEART RATE: 83 BPM | DIASTOLIC BLOOD PRESSURE: 76 MMHG | SYSTOLIC BLOOD PRESSURE: 130 MMHG

## 2023-02-13 DIAGNOSIS — R09.81 NASAL CONGESTION: ICD-10-CM

## 2023-02-13 PROCEDURE — 87804 INFLUENZA ASSAY W/OPTIC: CPT | Mod: QW

## 2023-02-13 PROCEDURE — 99214 OFFICE O/P EST MOD 30 MIN: CPT | Mod: 25

## 2023-02-13 NOTE — PLAN
[FreeTextEntry1] : 67-year-old female presents for URI symptoms\par Rhinosinusitis–sinus pressure afebrile physical exam unremarkable positive rhinorrhea\par A respiratory panel is drawn\par Empiric doxycycline 100 mg twice daily for 10 days\par Vitamin D deficiency–renewal of vitamin D 50,000 units on a weekly basis\par Hypertension–130/76\par Renewal of losartan/HCTZ 100/12.5 mg daily

## 2023-02-16 ENCOUNTER — RX RENEWAL (OUTPATIENT)
Age: 68
End: 2023-02-16

## 2023-02-17 LAB
HPIV2 RNA SPEC QL NAA+PROBE: DETECTED
RAPID RVP RESULT: DETECTED
SARS-COV-2 RNA PNL RESP NAA+PROBE: NOT DETECTED
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
## 2023-03-23 NOTE — HISTORY OF PRESENT ILLNESS
[de-identified] : 60yo F here for post op Micro direct laryngoscopy with excision of laryngeal stenosis, bronchoscopy with excision of tracheal  stenosis, balloon dilation, and therapeutic steroid injection 10/23/2020. Recovery went well. No bleeding. Breathing significantly improved. Voice is stable , comes and goes. No on any nebulizer treatments. Is on flovent which she got from hospital. Is on famotidine as well. Excited because the air is getting through the nose better.\par \par \par 
(M6) obeys commands

## 2023-04-07 ENCOUNTER — APPOINTMENT (OUTPATIENT)
Dept: OTOLARYNGOLOGY | Facility: CLINIC | Age: 68
End: 2023-04-07
Payer: MEDICARE

## 2023-04-07 PROCEDURE — 99214 OFFICE O/P EST MOD 30 MIN: CPT | Mod: 25

## 2023-04-07 PROCEDURE — 31579 LARYNGOSCOPY TELESCOPIC: CPT

## 2023-04-07 RX ORDER — BUDESONIDE 0.5 MG/2ML
0.5 INHALANT ORAL TWICE DAILY
Qty: 1 | Refills: 3 | Status: COMPLETED | COMMUNITY
Start: 2018-11-28 | End: 2023-04-07

## 2023-04-07 RX ORDER — OMEPRAZOLE 40 MG/1
40 CAPSULE, DELAYED RELEASE ORAL
Qty: 90 | Refills: 2 | Status: COMPLETED | COMMUNITY
Start: 2018-11-28 | End: 2023-04-07

## 2023-04-07 RX ORDER — BUDESONIDE 0.25 MG/2ML
0.25 INHALANT ORAL 3 TIMES DAILY
Qty: 1 | Refills: 5 | Status: COMPLETED | COMMUNITY
Start: 2019-03-14 | End: 2023-04-07

## 2023-04-07 RX ORDER — BUDESONIDE 0.25 MG/2ML
0.25 INHALANT ORAL TWICE DAILY
Qty: 2 | Refills: 1 | Status: COMPLETED | COMMUNITY
Start: 2019-01-29 | End: 2023-04-07

## 2023-04-07 RX ORDER — DOXYCYCLINE HYCLATE 100 MG/1
100 CAPSULE ORAL
Qty: 20 | Refills: 0 | Status: COMPLETED | COMMUNITY
Start: 2023-02-13 | End: 2023-04-07
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
## 2023-04-07 NOTE — CONSULT LETTER
[Dear  ___] : Dear  [unfilled], [Consult Letter:] : I had the pleasure of evaluating your patient, [unfilled]. [Please see my note below.] : Please see my note below. [Consult Closing:] : Thank you very much for allowing me to participate in the care of this patient.  If you have any questions, please do not hesitate to contact me. [Sincerely,] : Sincerely, [FreeTextEntry2] : Dr Padilla Ley \par  [FreeTextEntry3] : Anthony Hernandez MD, PhD\par Chief, Division of Laryngology\par Department of Otolaryngology\par [facility]\par Pediatric Otolaryngology, [facility]\par  of Otolaryngology\par [facility] [employer]\par

## 2023-04-07 NOTE — REASON FOR VISIT
[Subsequent Evaluation] : a subsequent evaluation for [Family Member] : family member [FreeTextEntry2] : Tracheal stenosis

## 2023-04-07 NOTE — PHYSICAL EXAM
[Midline] : trachea located in midline position [Laryngoscopy Performed] : laryngoscopy was performed, see procedure section for findings [Normal] : no rashes [de-identified] : small old stable red hamartoma on TM [de-identified] : moderately raspy and breathy voice, mild biphasic stridor

## 2023-05-09 ENCOUNTER — RX RENEWAL (OUTPATIENT)
Age: 68
End: 2023-05-09

## 2023-05-22 ENCOUNTER — APPOINTMENT (OUTPATIENT)
Dept: FAMILY MEDICINE | Facility: CLINIC | Age: 68
End: 2023-05-22
Payer: MEDICARE

## 2023-05-22 VITALS
OXYGEN SATURATION: 97 % | BODY MASS INDEX: 36.06 KG/M2 | WEIGHT: 191 LBS | SYSTOLIC BLOOD PRESSURE: 129 MMHG | RESPIRATION RATE: 15 BRPM | HEIGHT: 61 IN | TEMPERATURE: 97.4 F | DIASTOLIC BLOOD PRESSURE: 80 MMHG | HEART RATE: 65 BPM

## 2023-05-22 DIAGNOSIS — Z13.29 ENCOUNTER FOR SCREENING FOR OTHER SUSPECTED ENDOCRINE DISORDER: ICD-10-CM

## 2023-05-22 DIAGNOSIS — Z13.228 ENCOUNTER FOR SCREENING FOR OTHER SUSPECTED ENDOCRINE DISORDER: ICD-10-CM

## 2023-05-22 DIAGNOSIS — Z13.0 ENCOUNTER FOR SCREENING FOR OTHER SUSPECTED ENDOCRINE DISORDER: ICD-10-CM

## 2023-05-22 PROCEDURE — 99214 OFFICE O/P EST MOD 30 MIN: CPT | Mod: 25

## 2023-05-22 PROCEDURE — 36415 COLL VENOUS BLD VENIPUNCTURE: CPT

## 2023-05-22 NOTE — HEALTH RISK ASSESSMENT
[0] : 2) Feeling down, depressed, or hopeless: Not at all (0) [PHQ-2 Negative - No further assessment needed] : PHQ-2 Negative - No further assessment needed [[id number]] : 0

## 2023-05-22 NOTE — PLAN
[FreeTextEntry1] : 67-year-old female presents for evaluation\par Glucose intolerance–hemoglobin A1c 6.2 taken 2 years ago.  Fasting blood work is drawn today\par Hypertension–129/80 she is controlled on losartan/HCTZ 100/12.5 that medication is reviewed and renewed\par Laryngeal stridor–follows with ENT surgical intervention is being planned history of COPD

## 2023-05-26 LAB
ALBUMIN SERPL ELPH-MCNC: 4.5 G/DL
ALP BLD-CCNC: 129 U/L
ALT SERPL-CCNC: 22 U/L
ANION GAP SERPL CALC-SCNC: 13 MMOL/L
AST SERPL-CCNC: 16 U/L
BILIRUB SERPL-MCNC: 0.4 MG/DL
BUN SERPL-MCNC: 19 MG/DL
CALCIUM SERPL-MCNC: 11 MG/DL
CHLORIDE SERPL-SCNC: 101 MMOL/L
CHOLEST SERPL-MCNC: 230 MG/DL
CO2 SERPL-SCNC: 29 MMOL/L
CREAT SERPL-MCNC: 1.39 MG/DL
EGFR: 41 ML/MIN/1.73M2
ESTIMATED AVERAGE GLUCOSE: 134 MG/DL
GLUCOSE SERPL-MCNC: 101 MG/DL
HBA1C MFR BLD HPLC: 6.3 %
HDLC SERPL-MCNC: 38 MG/DL
LDLC SERPL CALC-MCNC: 143 MG/DL
NONHDLC SERPL-MCNC: 192 MG/DL
POTASSIUM SERPL-SCNC: 4.7 MMOL/L
PROT SERPL-MCNC: 7 G/DL
SODIUM SERPL-SCNC: 143 MMOL/L
TRIGL SERPL-MCNC: 242 MG/DL
TSH SERPL-ACNC: 2.61 UIU/ML

## 2023-06-22 RX ORDER — BUDESONIDE 0.25 MG/2ML
0.25 INHALANT ORAL TWICE DAILY
Qty: 2 | Refills: 1 | Status: ACTIVE | COMMUNITY
Start: 2022-08-31 | End: 1900-01-01

## 2023-07-07 ENCOUNTER — APPOINTMENT (OUTPATIENT)
Dept: OTOLARYNGOLOGY | Facility: CLINIC | Age: 68
End: 2023-07-07
Payer: MEDICARE

## 2023-07-07 VITALS
DIASTOLIC BLOOD PRESSURE: 73 MMHG | HEIGHT: 61 IN | SYSTOLIC BLOOD PRESSURE: 117 MMHG | BODY MASS INDEX: 33.99 KG/M2 | HEART RATE: 78 BPM | WEIGHT: 180 LBS

## 2023-07-07 PROCEDURE — 99214 OFFICE O/P EST MOD 30 MIN: CPT | Mod: 25

## 2023-07-07 PROCEDURE — 31579 LARYNGOSCOPY TELESCOPIC: CPT

## 2023-07-07 RX ORDER — SODIUM CHLORIDE FOR INHALATION 0.9 %
0.9 VIAL, NEBULIZER (ML) INHALATION
Qty: 2 | Refills: 4 | Status: COMPLETED | COMMUNITY
Start: 2019-01-31 | End: 2023-07-07

## 2023-07-07 RX ORDER — PROMETHAZINE HYDROCHLORIDE AND CODEINE PHOSPHATE 6.25; 1 MG/5ML; MG/5ML
6.25-1 SOLUTION ORAL
Qty: 200 | Refills: 0 | Status: COMPLETED | COMMUNITY
Start: 2022-05-27 | End: 2023-07-07

## 2023-07-07 RX ORDER — METHYLPREDNISOLONE 4 MG/1
4 TABLET ORAL
Qty: 1 | Refills: 0 | Status: COMPLETED | COMMUNITY
Start: 2023-06-22 | End: 2023-07-07

## 2023-07-07 NOTE — CONSULT LETTER
[Dear  ___] : Dear  [unfilled], [Consult Letter:] : I had the pleasure of evaluating your patient, [unfilled]. [Consult Closing:] : Thank you very much for allowing me to participate in the care of this patient.  If you have any questions, please do not hesitate to contact me. [Please see my note below.] : Please see my note below. [Sincerely,] : Sincerely, [FreeTextEntry2] : Dr Padilla Ley \par  [FreeTextEntry3] : Anthony Hernandez MD, PhD\par Chief, Division of Laryngology\par Department of Otolaryngology\par [facility]\par Pediatric Otolaryngology, [facility]\par  of Otolaryngology\par [facility] [employer]\par

## 2023-07-07 NOTE — HISTORY OF PRESENT ILLNESS
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 Stridor has been progressively getting worse\par Continues to take omeprazole and famotidine daily \par Did not get another speech and swallow evaluation, did not restart voice therapy\par Have not done budesonide neb treatments due to insurance purposes, been using saline nebs twice a day\par Denies dysphagia, odynophagia, aspirations, otalgia, fevers, infections\par Took oral steroids without difference, didn't get budesonide filled\par

## 2023-07-07 NOTE — PHYSICAL EXAM
[de-identified] : small old stable red hamartoma on TM [Midline] : trachea located in midline position [Laryngoscopy Performed] : laryngoscopy was performed, see procedure section for findings [Normal] : no rashes [de-identified] : moderately raspy and breathy voice, mild biphasic stridor

## 2023-07-13 ENCOUNTER — NON-APPOINTMENT (OUTPATIENT)
Age: 68
End: 2023-07-13

## 2023-07-14 ENCOUNTER — NON-APPOINTMENT (OUTPATIENT)
Age: 68
End: 2023-07-14

## 2023-08-16 ENCOUNTER — RX RENEWAL (OUTPATIENT)
Age: 68
End: 2023-08-16

## 2023-08-22 ENCOUNTER — RX RENEWAL (OUTPATIENT)
Age: 68
End: 2023-08-22

## 2023-08-23 ENCOUNTER — APPOINTMENT (OUTPATIENT)
Dept: FAMILY MEDICINE | Facility: CLINIC | Age: 68
End: 2023-08-23
Payer: MEDICARE

## 2023-08-23 VITALS
DIASTOLIC BLOOD PRESSURE: 80 MMHG | HEART RATE: 66 BPM | TEMPERATURE: 97.4 F | WEIGHT: 176 LBS | OXYGEN SATURATION: 97 % | SYSTOLIC BLOOD PRESSURE: 120 MMHG | BODY MASS INDEX: 33.23 KG/M2 | RESPIRATION RATE: 16 BRPM | HEIGHT: 61 IN

## 2023-08-23 DIAGNOSIS — R49.0 DYSPHONIA: ICD-10-CM

## 2023-08-23 PROCEDURE — 99214 OFFICE O/P EST MOD 30 MIN: CPT

## 2023-08-23 RX ORDER — ERGOCALCIFEROL 1.25 MG/1
1.25 MG CAPSULE ORAL
Qty: 13 | Refills: 0 | Status: ACTIVE | COMMUNITY
Start: 1900-01-01 | End: 1900-01-01

## 2023-08-23 NOTE — PLAN
[FreeTextEntry1] : 67-year-old female presents for evaluation Vitamin D deficiency–renewal of supplementation, 50,000 units capsule, on a weekly basis Hypertension–120/80 Losartan–HCTZ 100-12.5 mg tablet daily Gait weakness–make sure she walks about 30 minutes every day not a fall risk precaution

## 2023-09-23 ENCOUNTER — RX RENEWAL (OUTPATIENT)
Age: 68
End: 2023-09-23

## 2023-10-10 ENCOUNTER — APPOINTMENT (OUTPATIENT)
Dept: OTOLARYNGOLOGY | Facility: CLINIC | Age: 68
End: 2023-10-10
Payer: MEDICARE

## 2023-10-10 VITALS
OXYGEN SATURATION: 98 % | SYSTOLIC BLOOD PRESSURE: 120 MMHG | BODY MASS INDEX: 33.23 KG/M2 | HEART RATE: 65 BPM | WEIGHT: 176 LBS | DIASTOLIC BLOOD PRESSURE: 78 MMHG | RESPIRATION RATE: 17 BRPM | HEIGHT: 61 IN

## 2023-10-10 PROCEDURE — 99214 OFFICE O/P EST MOD 30 MIN: CPT | Mod: 25

## 2023-10-10 PROCEDURE — 31579 LARYNGOSCOPY TELESCOPIC: CPT

## 2023-10-10 RX ORDER — SODIUM CHLORIDE FOR INHALATION 0.9 %
0.9 VIAL, NEBULIZER (ML) INHALATION
Qty: 1 | Refills: 4 | Status: ACTIVE | COMMUNITY
Start: 2019-01-24 | End: 1900-01-01

## 2023-11-16 RX ORDER — FLUTICASONE PROPIONATE 220 UG/1
220 AEROSOL, METERED RESPIRATORY (INHALATION)
Qty: 1 | Refills: 0 | Status: ACTIVE | COMMUNITY
Start: 2023-10-10 | End: 1900-01-01

## 2023-11-27 ENCOUNTER — RX RENEWAL (OUTPATIENT)
Age: 68
End: 2023-11-27

## 2023-11-28 ENCOUNTER — APPOINTMENT (OUTPATIENT)
Dept: FAMILY MEDICINE | Facility: CLINIC | Age: 68
End: 2023-11-28
Payer: MEDICARE

## 2023-11-28 VITALS
TEMPERATURE: 97.2 F | BODY MASS INDEX: 30.23 KG/M2 | WEIGHT: 160.13 LBS | OXYGEN SATURATION: 97 % | SYSTOLIC BLOOD PRESSURE: 122 MMHG | DIASTOLIC BLOOD PRESSURE: 83 MMHG | HEART RATE: 81 BPM | HEIGHT: 61 IN

## 2023-11-28 PROCEDURE — 36415 COLL VENOUS BLD VENIPUNCTURE: CPT

## 2023-11-28 PROCEDURE — 99214 OFFICE O/P EST MOD 30 MIN: CPT | Mod: 25

## 2023-12-02 ENCOUNTER — RX RENEWAL (OUTPATIENT)
Age: 68
End: 2023-12-02

## 2023-12-04 ENCOUNTER — APPOINTMENT (OUTPATIENT)
Dept: FAMILY MEDICINE | Facility: CLINIC | Age: 68
End: 2023-12-04
Payer: MEDICARE

## 2023-12-04 PROCEDURE — 36415 COLL VENOUS BLD VENIPUNCTURE: CPT

## 2023-12-07 NOTE — H&P PST ADULT - CONSTITUTIONAL DETAILS
Group Topic:  Process Group     Date: 2023  Start Time:  9:00 AM  End Time:  9:50 AM  Facilitators: Cindy Queen LCPC    Focus: Morning Review  Number in attendance: 2  This group was done in person at [address]: [address]: Identification of problem areas while not attending treatment setting. Establishment of goals for today. Evaluation of self-administration of medications:      SLEEP - # of hours: 8   normal  APPETITE -   # of meals: 3     normal  THOUGHTS of KILLING or HURTING SELF or OTHERS? None reported  Notes:  MEDICATION:  Are you taking medications as prescribed?     yes  Notes:  Are you having any side effects? Feeling sleepy during the day  Notes:  DRUG/ALCOHOL USE:     None reported  Notes:  HALLUCINATIONS:     None reported  Notes:  DELUSIONS:   None reported  Notes:    Rate your mood 1-10 (1 = low, 10 = high). Ratin/10    Any problems that occurred during the evening or morning that affected mood? The patient reported that she keeps herself busy.      Treatment related goal: issues with her insurance    Helpful coping skill(s) used in the last 24 hours: keeping busy at home      Comments: [facility] well-nourished/well-groomed/well-developed

## 2023-12-22 ENCOUNTER — RX RENEWAL (OUTPATIENT)
Age: 68
End: 2023-12-22

## 2024-02-01 DIAGNOSIS — Z13.820 ENCOUNTER FOR SCREENING FOR OSTEOPOROSIS: ICD-10-CM

## 2024-02-07 ENCOUNTER — APPOINTMENT (OUTPATIENT)
Dept: OTOLARYNGOLOGY | Facility: CLINIC | Age: 69
End: 2024-02-07
Payer: MEDICARE

## 2024-02-07 PROCEDURE — 99204 OFFICE O/P NEW MOD 45 MIN: CPT | Mod: 25

## 2024-02-07 PROCEDURE — 31579 LARYNGOSCOPY TELESCOPIC: CPT

## 2024-02-07 PROCEDURE — 99214 OFFICE O/P EST MOD 30 MIN: CPT | Mod: 25

## 2024-02-07 RX ORDER — PREDNISONE 10 MG/1
10 TABLET ORAL DAILY
Qty: 100 | Refills: 0 | Status: ACTIVE | COMMUNITY
Start: 2024-02-07 | End: 1900-01-01

## 2024-02-07 NOTE — HISTORY OF PRESENT ILLNESS
[de-identified] : 68 year old female with history of Tracheal stenosis s/p Micro direct laryngoscopy with excision of subglottic stenosis, bronchoscopy with excision of tracheal stenosis, balloon dilation, therapeutic steroid injection, and injection laryngoplasty 08/29/22 Breathing has gotten worse,  Is dysphonic, voice comes and goes.  Reports constant cough is unchanged.  Denies throat pain, globus sensation, dysphagia, pain while swallowing and recent throat infections.  Continues to take omeprazole and famotidine daily Continues to use budesonide neb and saline nebs, patient will like a refill.  No recent scans.

## 2024-02-12 ENCOUNTER — OUTPATIENT (OUTPATIENT)
Dept: OUTPATIENT SERVICES | Facility: HOSPITAL | Age: 69
LOS: 1 days | End: 2024-02-12
Payer: MEDICARE

## 2024-02-12 VITALS
TEMPERATURE: 98 F | HEART RATE: 73 BPM | HEIGHT: 60.5 IN | RESPIRATION RATE: 16 BRPM | OXYGEN SATURATION: 99 % | WEIGHT: 145.06 LBS | SYSTOLIC BLOOD PRESSURE: 133 MMHG | DIASTOLIC BLOOD PRESSURE: 76 MMHG

## 2024-02-12 DIAGNOSIS — J38.7 OTHER DISEASES OF LARYNX: Chronic | ICD-10-CM

## 2024-02-12 DIAGNOSIS — J38.6 STENOSIS OF LARYNX: ICD-10-CM

## 2024-02-12 DIAGNOSIS — J38.01 PARALYSIS OF VOCAL CORDS AND LARYNX, UNILATERAL: ICD-10-CM

## 2024-02-12 DIAGNOSIS — J39.8 OTHER SPECIFIED DISEASES OF UPPER RESPIRATORY TRACT: ICD-10-CM

## 2024-02-12 DIAGNOSIS — Z98.890 OTHER SPECIFIED POSTPROCEDURAL STATES: Chronic | ICD-10-CM

## 2024-02-12 DIAGNOSIS — Z98.89 OTHER SPECIFIED POSTPROCEDURAL STATES: Chronic | ICD-10-CM

## 2024-02-12 DIAGNOSIS — I10 ESSENTIAL (PRIMARY) HYPERTENSION: ICD-10-CM

## 2024-02-12 DIAGNOSIS — Z91.040 LATEX ALLERGY STATUS: ICD-10-CM

## 2024-02-12 LAB
ANION GAP SERPL CALC-SCNC: 11 MMOL/L — SIGNIFICANT CHANGE UP (ref 7–14)
BASOPHILS # BLD AUTO: 0.03 K/UL — SIGNIFICANT CHANGE UP (ref 0–0.2)
BASOPHILS NFR BLD AUTO: 0.2 % — SIGNIFICANT CHANGE UP (ref 0–2)
BUN SERPL-MCNC: 27 MG/DL — HIGH (ref 7–23)
CALCIUM SERPL-MCNC: 10.8 MG/DL — HIGH (ref 8.4–10.5)
CHLORIDE SERPL-SCNC: 98 MMOL/L — SIGNIFICANT CHANGE UP (ref 98–107)
CO2 SERPL-SCNC: 29 MMOL/L — SIGNIFICANT CHANGE UP (ref 22–31)
CREAT SERPL-MCNC: 1.16 MG/DL — SIGNIFICANT CHANGE UP (ref 0.5–1.3)
EGFR: 51 ML/MIN/1.73M2 — LOW
EOSINOPHIL # BLD AUTO: 0.01 K/UL — SIGNIFICANT CHANGE UP (ref 0–0.5)
EOSINOPHIL NFR BLD AUTO: 0.1 % — SIGNIFICANT CHANGE UP (ref 0–6)
GLUCOSE SERPL-MCNC: 134 MG/DL — HIGH (ref 70–99)
HCT VFR BLD CALC: 34.4 % — LOW (ref 34.5–45)
HGB BLD-MCNC: 11.4 G/DL — LOW (ref 11.5–15.5)
IANC: 12.01 K/UL — HIGH (ref 1.8–7.4)
IMM GRANULOCYTES NFR BLD AUTO: 1.3 % — HIGH (ref 0–0.9)
LYMPHOCYTES # BLD AUTO: 1.42 K/UL — SIGNIFICANT CHANGE UP (ref 1–3.3)
LYMPHOCYTES # BLD AUTO: 10 % — LOW (ref 13–44)
MCHC RBC-ENTMCNC: 27.2 PG — SIGNIFICANT CHANGE UP (ref 27–34)
MCHC RBC-ENTMCNC: 33.1 GM/DL — SIGNIFICANT CHANGE UP (ref 32–36)
MCV RBC AUTO: 82.1 FL — SIGNIFICANT CHANGE UP (ref 80–100)
MONOCYTES # BLD AUTO: 0.48 K/UL — SIGNIFICANT CHANGE UP (ref 0–0.9)
MONOCYTES NFR BLD AUTO: 3.4 % — SIGNIFICANT CHANGE UP (ref 2–14)
NEUTROPHILS # BLD AUTO: 12.01 K/UL — HIGH (ref 1.8–7.4)
NEUTROPHILS NFR BLD AUTO: 85 % — HIGH (ref 43–77)
NRBC # BLD: 0 /100 WBCS — SIGNIFICANT CHANGE UP (ref 0–0)
NRBC # FLD: 0.02 K/UL — HIGH (ref 0–0)
PLATELET # BLD AUTO: 549 K/UL — HIGH (ref 150–400)
POTASSIUM SERPL-MCNC: 4.3 MMOL/L — SIGNIFICANT CHANGE UP (ref 3.5–5.3)
POTASSIUM SERPL-SCNC: 4.3 MMOL/L — SIGNIFICANT CHANGE UP (ref 3.5–5.3)
RBC # BLD: 4.19 M/UL — SIGNIFICANT CHANGE UP (ref 3.8–5.2)
RBC # FLD: 15.4 % — HIGH (ref 10.3–14.5)
SODIUM SERPL-SCNC: 138 MMOL/L — SIGNIFICANT CHANGE UP (ref 135–145)
WBC # BLD: 14.14 K/UL — HIGH (ref 3.8–10.5)
WBC # FLD AUTO: 14.14 K/UL — HIGH (ref 3.8–10.5)

## 2024-02-12 PROCEDURE — 93010 ELECTROCARDIOGRAM REPORT: CPT

## 2024-02-12 RX ORDER — ERGOCALCIFEROL 1.25 MG/1
1 CAPSULE ORAL
Qty: 0 | Refills: 0 | DISCHARGE

## 2024-02-12 RX ORDER — SODIUM CHLORIDE 9 MG/ML
1000 INJECTION, SOLUTION INTRAVENOUS
Refills: 0 | Status: DISCONTINUED | OUTPATIENT
Start: 2024-02-19 | End: 2024-03-04

## 2024-02-12 RX ORDER — OMEPRAZOLE 10 MG/1
1 CAPSULE, DELAYED RELEASE ORAL
Qty: 0 | Refills: 0 | DISCHARGE

## 2024-02-12 NOTE — H&P PST ADULT - PROBLEM SELECTOR PLAN 1
Schedule for microdirect laryngoscopy with excision, bronchoscopy with excision, injection laryngoplasty tentatively on 02/19/24. Pre op instructions given and explained. Pt verbalized understanding.

## 2024-02-12 NOTE — H&P PST ADULT - CARDIOVASCULAR
details… S1 S2 present/no murmur/no pedal edema regular rate and rhythm/S1 S2 present/no murmur/no pedal edema

## 2024-02-12 NOTE — H&P PST ADULT - NEGATIVE GASTROINTESTINAL SYMPTOMS
Cale Sung(Attending) no nausea/no vomiting/no diarrhea/no constipation/no change in bowel habits/no abdominal pain/no melena/no jaundice/no hiccoughs

## 2024-02-12 NOTE — H&P PST ADULT - HISTORY OF PRESENT ILLNESS
67 y/o female with H/O: HTN   presents to [facility] for preoperative evaluation with pre op dx of stenosis of larynx. S/P multiple laryngoscopies with  laser/dilation procedures since diagnosis in 2010. S/P microdirect laryngoscopy and bronchoscopy with excision stenosis on 12/9/19. Schedule for microdirect laryngoscopy and bronchoscopy with excision stenosis    my trachea closes up     69 y/o female with H/O: HTN, GERD presents with pre op diagnosis; Stenosis of larynx. S/P multiple laryngoscopies with laser/dilation procedures since diagnosis in 2010. S/P microdirect laryngoscopy and bronchoscopy with excision stenosis on 12/9/19. Schedule for microdirect laryngoscopy and bronchoscopy with excision stenosis in 2022. Pt presents today for Microdirect laryngoscopy with excision, bronchoscopy with excision, injection laryngoplasty tentatively on 02/19/24

## 2024-02-12 NOTE — H&P PST ADULT - NEGATIVE PSYCHIATRIC SYMPTOMS
no suicidal ideation/no depression/no anxiety/no insomnia/no memory loss/no visual hallucinations/no auditory hallucinations

## 2024-02-12 NOTE — H&P PST ADULT - RESPIRATORY
no wheezes/no rales/no rhonchi/no respiratory distress/no subcutaneous emphysema/airway patent/breath sounds equal/good air movement/respirations non-labored/no dull ness or hyperresonance to percussion no wheezes/no rales/no rhonchi/no respiratory distress/no subcutaneous emphysema/airway patent/breath sounds equal/good air movement/respirations non-labored/no dull ness or hyperresonance to percussion/stridor

## 2024-02-14 ENCOUNTER — APPOINTMENT (OUTPATIENT)
Dept: FAMILY MEDICINE | Facility: CLINIC | Age: 69
End: 2024-02-14
Payer: MEDICARE

## 2024-02-14 VITALS
TEMPERATURE: 96.3 F | SYSTOLIC BLOOD PRESSURE: 119 MMHG | WEIGHT: 149.5 LBS | OXYGEN SATURATION: 98 % | BODY MASS INDEX: 28.22 KG/M2 | HEART RATE: 62 BPM | DIASTOLIC BLOOD PRESSURE: 78 MMHG | HEIGHT: 61 IN

## 2024-02-14 DIAGNOSIS — J39.8 OTHER SPECIFIED DISEASES OF UPPER RESPIRATORY TRACT: ICD-10-CM

## 2024-02-14 DIAGNOSIS — R79.89 OTHER SPECIFIED ABNORMAL FINDINGS OF BLOOD CHEMISTRY: ICD-10-CM

## 2024-02-14 PROCEDURE — G0439: CPT

## 2024-02-14 RX ORDER — ERGOCALCIFEROL 1.25 MG/1
1.25 MG CAPSULE, LIQUID FILLED ORAL
Qty: 13 | Refills: 0 | Status: ACTIVE | COMMUNITY
Start: 2022-11-16 | End: 1900-01-01

## 2024-02-16 NOTE — ASU PATIENT PROFILE, ADULT - PRO ARRIVE FROM
Routine visit. Parents report baby is breastfeeding better than last night.   Instructed on signs/symptoms of engorgement/ plugged ducts and mastitis.  Instructed on comfort measures and when to call MD.  Getting ready for discharge.  Plan: Watch for feeding cues and feed every 2-3 hours and/or on demand. Continue to use feeding log to track intake and appropriate voids and stools. Take feeding log to first follow up appointment or weight check. Encourage skin to skin to promote frequent feedings, thermoregulation and bonding. Follow-up with healthcare provider or lactation consultant for questions or concerns. Parents very thankful for time and tips/information. No further questions at this time.    Nelda Mckeon BSN, RN, PHN, RNC-MNN, IBCLC    home

## 2024-02-16 NOTE — ASU PATIENT PROFILE, ADULT - ALCOHOL USE HISTORY SINGLE SELECT
Final Diagnosis:  1  Alcohol intoxication ([facility] [address] )    2  Alcohol abuse    3  Alcoholic intoxication without complication [facility])        Chief Complaint   Patient presents with    Alcohol Intoxication     pt found drunk and pooping in storm drain  pt has no compalints or pain  police found handle of vodka in back pack and dumped before hospital arrival     HPI  58-year-old man history of alcoholism patient was found having a bowel movement in a storm drain by police  He was intoxicated  He had a handle of vodka is backpack  He reports no other ingestions other than alcohol  He reports the of the history with draw which is mainly just shakiness and anxiety but no history of seizures  On second, sobered evaluation, he reports the need for detox and the desire to quit  Still no additional medical complaint at this time     - No language barrier    - History obtained from patient  - There are no limitations to the history obtained  - Previous charting was reviewed    PMH:   has a past medical history of Alcoholism ([facility] [address] )  PSH:   has no past surgical history on file  ROS:  Review of Systems   Constitutional: Negative for activity change, chills, diaphoresis, fatigue and fever  HENT: Negative for congestion, postnasal drip, rhinorrhea, sneezing, sore throat and trouble swallowing  Eyes: Negative for visual disturbance  Respiratory: Negative for cough, chest tightness, shortness of breath and wheezing  Cardiovascular: Negative for chest pain and leg swelling  Gastrointestinal: Negative for abdominal distention, abdominal pain, blood in stool, constipation, diarrhea, nausea and vomiting  Genitourinary: Negative for decreased urine volume, dysuria, flank pain, frequency, hematuria and urgency  Skin: Negative for color change and rash  Neurological: Negative for syncope, weakness, light-headedness and headaches  Psychiatric/Behavioral: Positive for behavioral problems   Negative for confusion, sleep disturbance and suicidal ideas  All other systems reviewed and are negative  PE:   Vitals:    07/28/20 2019 07/29/20 0026 07/29/20 1002   BP: 108/66 93/54 157/86   BP Location: Right arm Right arm    Pulse: 90 80 63   Resp: 18 17 16   Temp: 98 6 °F (37 °C)     TempSrc: Oral     SpO2: 96% 96% 98%   Weight: 82 1 kg (181 lb)     Height: 5' 7" (1 702 m)       Vitals reviewed by me  Physical Exam   Constitutional: He is oriented to person, place, and time  He appears well-developed and well-nourished  No distress  HENT:   Head: Normocephalic and atraumatic  Nose: Nose normal    Eyes: Pupils are equal, round, and reactive to light  Conjunctivae and EOM are normal  No scleral icterus  Neck: Normal range of motion  Neck supple  No tracheal deviation present  Cardiovascular: Normal rate, regular rhythm, normal heart sounds and intact distal pulses  No murmur heard  Pulmonary/Chest: Effort normal and breath sounds normal  No stridor  No respiratory distress  He has no wheezes  Abdominal: Soft  Bowel sounds are normal  He exhibits no distension  There is no tenderness  There is no guarding  Musculoskeletal: Normal range of motion  He exhibits no edema, tenderness or deformity  Neurological: He is alert and oriented to person, place, and time  No cranial nerve deficit or sensory deficit  He exhibits normal muscle tone  Skin: Skin is warm and dry  Capillary refill takes less than 2 seconds  He is not diaphoretic  Psychiatric: He has a normal mood and affect  His behavior is normal  Judgment and thought content normal    Nursing note and vitals reviewed  A:  - Nursing note reviewed       No concern for withdrawal at this time    Patient will get BAT and covid swab and UDS and discuss with crisis                 No orders to display     Orders Placed This Encounter   Procedures    Novel Coronavirus (Covid-19),PCR [facility]    Rapid drug screen, urine    Consult to ED crisis worker    POCT alcohol breath test    POCT alcohol breath test    POCT alcohol breath test     Labs Reviewed   NOVEL CORONAVIRUS (COVID-19), PCR [facility] - Normal       Result Value Ref Range Status    SARS-CoV-2 Negative  Negative Final    Narrative: The specimen collection materials, transport medium, and/or testing methodology utilized in the production of these test results have been proven to be reliable in a limited validation with an abbreviated program under the Emergency Utilization Authorization provided by the FDA  Testing reported as "Presumptive positive" will be confirmed with secondary testing with a reference laboratory to ensure result accuracy  Clinical caution and judgement should be used with the interpretation of these results with consideration of the clinical impression and other laboratory testing  Testing reported as "Positive" or "Negative" has been proven to be accurate according to standard laboratory validation requirements  All testing is performed with control materials showing appropriate reactivity at standard intervals  RAPID DRUG SCREEN, URINE - Normal    Amph/Meth UR Negative  Negative Final    Barbiturate Ur Negative  Negative Final    Benzodiazepine Urine Negative  Negative Final    Cocaine Urine Negative  Negative Final    Methadone Urine Negative  Negative Final    Opiate Urine Negative  Negative Final    PCP Ur Negative  Negative Final    THC Urine Negative  Negative Final    Oxycodone Urine Negative  Negative Final    Narrative:     FOR MEDICAL PURPOSES ONLY  IF CONFIRMATION NEEDED PLEASE CONTACT THE LAB WITHIN 5 DAYS      Drug Screen Cutoff Levels:  AMPHETAMINE/METHAMPHETAMINES  1000 ng/mL  BARBITURATES     200 ng/mL  BENZODIAZEPINES     200 ng/mL  COCAINE      300 ng/mL  METHADONE      300 ng/mL  OPIATES      300 ng/mL  PHENCYCLIDINE     25 ng/mL  THC       50 ng/mL  OXYCODONE      100 ng/mL   POCT ALCOHOL BREATH TEST - Normal    EXTBreath Alcohol 0 226   Final   POCT ALCOHOL BREATH TEST - Normal    EXTBreath Alcohol 0 11   Final   POCT ALCOHOL BREATH TEST - Normal    EXTBreath Alcohol 0 09   Final       Final Diagnosis:  1  Alcohol intoxication ([facility] [address] )    2  Alcohol abuse    3  Alcoholic intoxication without complication ([facility] [address] )        P:  - signed out to incoming resident  Stable at my leaving and pending crisis eval    Medications   LORazepam (ATIVAN) tablet 1 mg (1 mg Oral Given 7/29/20 1111)     Time reflects when diagnosis was documented in both MDM as applicable and the Disposition within this note     Time User Action Codes Description Comment    7/29/2020  6:14 AM Check, Horacio Philip Add [F10 929] Alcohol intoxication ([facility] [address] )     7/29/2020  6:14 AM CheckHoracio Add [F10 10] Alcohol abuse     7/29/2020 11:19 AM Titi Lights Add [X74 269] Alcoholic intoxication without complication [facility])       ED Disposition     ED Disposition Condition Date/Time Comment    Discharge Stable Wed Jul 29, 2020 11:19 AM Caridad Díaz discharge to home/self care  Follow-up Information     Follow up With Specialties Details Why Contact Info Additional Information    [facility] [address] [facility] Schedule an appointment as soon as possible for a visit   [facility] [address] [phone] [facility] [address] [facility], [address] [address], [address] [place] [address], [phone], [phone]        Discharge Medication List as of 7/29/2020 11:20 AM      CONTINUE these medications which have NOT CHANGED    Details   traZODone (DESYREL) 100 mg tablet Take 1 tablet (100 mg total) by mouth daily at bedtime, Starting Wed 7/22/2020, Normal      venlafaxine (EFFEXOR-XR) 37 5 mg 24 hr capsule Take 1 capsule (37 5 mg total) by mouth daily, Starting Wed 7/22/2020, Normal           No discharge procedures on file  Prior to Admission Medications   Prescriptions Last Dose Informant Patient Reported?  Taking?   traZODone (DESYREL) 100 mg tablet Not Taking at Unknown time  No No   Sig: Take 1 tablet (100 mg total) by mouth daily at bedtime   Patient not taking: Reported on 7/28/2020   venlafaxine (EFFEXOR-XR) 37 5 mg 24 hr capsule Not Taking at Unknown time  No No   Sig: Take 1 capsule (37 5 mg total) by mouth daily   Patient not taking: Reported on 7/28/2020      Facility-Administered Medications: None       Portions of the record may have been created with voice recognition software  Occasional wrong word or "sound a like" substitutions may have occurred due to the inherent limitations of voice recognition software  Read the chart carefully and recognize, using context, where substitutions have occurred      Electronically signed by:  Slime Rosales, PGY 2, MD Bret Rankin MD  07/31/20 4268 never

## 2024-02-18 ENCOUNTER — TRANSCRIPTION ENCOUNTER (OUTPATIENT)
Age: 69
End: 2024-02-18

## 2024-02-19 ENCOUNTER — TRANSCRIPTION ENCOUNTER (OUTPATIENT)
Age: 69
End: 2024-02-19

## 2024-02-19 ENCOUNTER — APPOINTMENT (OUTPATIENT)
Dept: OTOLARYNGOLOGY | Facility: HOSPITAL | Age: 69
End: 2024-02-19

## 2024-02-19 ENCOUNTER — OUTPATIENT (OUTPATIENT)
Dept: OUTPATIENT SERVICES | Facility: HOSPITAL | Age: 69
LOS: 1 days | Discharge: HOME | End: 2024-02-19
Payer: MEDICARE

## 2024-02-19 VITALS
HEIGHT: 60.5 IN | HEART RATE: 70 BPM | WEIGHT: 145.06 LBS | OXYGEN SATURATION: 100 % | DIASTOLIC BLOOD PRESSURE: 49 MMHG | TEMPERATURE: 98 F | SYSTOLIC BLOOD PRESSURE: 113 MMHG | RESPIRATION RATE: 18 BRPM

## 2024-02-19 VITALS
TEMPERATURE: 98 F | RESPIRATION RATE: 14 BRPM | HEART RATE: 77 BPM | OXYGEN SATURATION: 99 % | SYSTOLIC BLOOD PRESSURE: 105 MMHG | DIASTOLIC BLOOD PRESSURE: 72 MMHG

## 2024-02-19 DIAGNOSIS — J39.8 OTHER SPECIFIED DISEASES OF UPPER RESPIRATORY TRACT: ICD-10-CM

## 2024-02-19 DIAGNOSIS — Z98.89 OTHER SPECIFIED POSTPROCEDURAL STATES: Chronic | ICD-10-CM

## 2024-02-19 DIAGNOSIS — J38.7 OTHER DISEASES OF LARYNX: Chronic | ICD-10-CM

## 2024-02-19 DIAGNOSIS — Z98.890 OTHER SPECIFIED POSTPROCEDURAL STATES: Chronic | ICD-10-CM

## 2024-02-19 PROCEDURE — 31541 LARYNSCOP W/TUMR EXC + SCOPE: CPT

## 2024-02-19 PROCEDURE — 31571 LARYNGOSCOP W/VC INJ + SCOPE: CPT | Mod: 59

## 2024-02-19 PROCEDURE — 31641 BRONCHOSCOPY TREAT BLOCKAGE: CPT | Mod: 59

## 2024-02-19 DEVICE — IMPLANTABLE DEVICE: Type: IMPLANTABLE DEVICE | Status: FUNCTIONAL

## 2024-02-19 DEVICE — BLLN CRE 12 MM: Type: IMPLANTABLE DEVICE | Status: FUNCTIONAL

## 2024-02-19 DEVICE — BLLN CRE PULM 15 THRU 18MM X 5.5CM: Type: IMPLANTABLE DEVICE | Status: FUNCTIONAL

## 2024-02-19 RX ORDER — HYDROMORPHONE HYDROCHLORIDE 2 MG/ML
0.5 INJECTION INTRAMUSCULAR; INTRAVENOUS; SUBCUTANEOUS
Refills: 0 | Status: DISCONTINUED | OUTPATIENT
Start: 2024-02-19 | End: 2024-02-19

## 2024-02-19 RX ORDER — OMEPRAZOLE 10 MG/1
1 CAPSULE, DELAYED RELEASE ORAL
Refills: 0 | DISCHARGE

## 2024-02-19 RX ORDER — LOSARTAN/HYDROCHLOROTHIAZIDE 100MG-25MG
1 TABLET ORAL
Qty: 0 | Refills: 0 | DISCHARGE

## 2024-02-19 RX ORDER — FAMOTIDINE 10 MG/ML
1 INJECTION INTRAVENOUS
Qty: 0 | Refills: 0 | DISCHARGE

## 2024-02-19 RX ORDER — ONDANSETRON 8 MG/1
4 TABLET, FILM COATED ORAL ONCE
Refills: 0 | Status: DISCONTINUED | OUTPATIENT
Start: 2024-02-19 | End: 2024-03-04

## 2024-02-19 RX ORDER — FENTANYL CITRATE 50 UG/ML
25 INJECTION INTRAVENOUS
Refills: 0 | Status: DISCONTINUED | OUTPATIENT
Start: 2024-02-19 | End: 2024-02-19

## 2024-02-19 NOTE — ASU DISCHARGE PLAN (ADULT/PEDIATRIC) - FOLLOW UP APPOINTMENTS
304 may also call Recovery Room (PACU) 24/7 @ [phone]/[facility]

## 2024-02-19 NOTE — ASU DISCHARGE PLAN (ADULT/PEDIATRIC) - ASU DC SPECIAL INSTRUCTIONSFT
Pain control:  Please continue to take tylenol as needed for pain. DO NOT exceed 4000 mg per day. Please take anti-reflux medications as prescribed and use budesonide nebulizer twice a day until follow up.    Medications:  Please continue to take all of your other medications as prescribed.     Diet: Please resume your regular diet.    Please avoid heavy lifting and strenuous activity for 2 weeks following your surgery.    Please call clinic to confirm follow up appointment. Pain control:  Please continue to take tylenol as needed for pain. DO NOT exceed 4000 mg per day. Please take anti-reflux medications as prescribed and use budesonide nebulizer twice a day until follow up.    Medications:  Please continue to take all of your other medications as prescribed.     Diet: Please continue soft diet for 2 weeks.    Please avoid heavy lifting and strenuous activity for 2 weeks following your surgery.    Please call clinic to confirm follow up appointment.

## 2024-02-19 NOTE — ASU DISCHARGE PLAN (ADULT/PEDIATRIC) - CARE PROVIDER_API CALL
Anthony Hernandez  Otolaryngology  [address] [place] [address]  Phone: [phone]  Fax: [phone]  Follow Up Time:

## 2024-02-24 NOTE — HISTORY OF PRESENT ILLNESS
[FreeTextEntry1] : Annual history and physical  endo monday/preop clearance [de-identified] : armida estevez

## 2024-02-24 NOTE — HEALTH RISK ASSESSMENT
[0] : 2) Feeling down, depressed, or hopeless: Not at all (0) [PHQ-2 Negative - No further assessment needed] : PHQ-2 Negative - No further assessment needed [[id number]] : 0

## 2024-02-24 NOTE — PLAN
[FreeTextEntry1] : 68-year-old female presents for annual wellness exam Preop exam-scheduled to undergo endoscopy on Monday 219 5 foot 1 and 249 pound female blood pressure 119/78 [id number] as per GI Hypertension-119/78 losartan/HCTZ 100-12.51 tablet daily Vitamin D deficiency-5000 units weekly This 68-year-old female is medically cleared for endoscopic procedure

## 2024-02-28 ENCOUNTER — RX RENEWAL (OUTPATIENT)
Age: 69
End: 2024-02-28

## 2024-03-19 ENCOUNTER — RX RENEWAL (OUTPATIENT)
Age: 69
End: 2024-03-19

## 2024-03-20 ENCOUNTER — APPOINTMENT (OUTPATIENT)
Dept: OTOLARYNGOLOGY | Facility: CLINIC | Age: 69
End: 2024-03-20
Payer: MEDICARE

## 2024-03-20 VITALS — BODY MASS INDEX: 28.13 KG/M2 | HEIGHT: 61 IN | WEIGHT: 149 LBS

## 2024-03-20 PROCEDURE — 99213 OFFICE O/P EST LOW 20 MIN: CPT | Mod: 25

## 2024-03-20 PROCEDURE — 31579 LARYNGOSCOPY TELESCOPIC: CPT

## 2024-03-20 RX ORDER — AMOXICILLIN AND CLAVULANATE POTASSIUM 875; 125 MG/1; MG/1
875-125 TABLET, COATED ORAL
Qty: 20 | Refills: 0 | Status: ACTIVE | COMMUNITY
Start: 2024-03-20 | End: 1900-01-01

## 2024-03-20 NOTE — ADDENDUM
[FreeTextEntry1] :   Documented by Dany Lozano acting as scribe for Dr. Hernandez on 03/20/2024. All Medical record entries made by the Scribe were at my, Dr. Hernandez, direction and personally dictated by me on 03/20/2024 . I have reviewed the chart and agree that the record accurately reflects my personal performance of the history, physical exam, assessment and plan. I have also personally directed, reviewed, and agreed with the discharge instructions.

## 2024-03-20 NOTE — HISTORY OF PRESENT ILLNESS
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  Denies throat pain, globus sensation, dysphagia, recent fevers or infections, or bleeding.  Continues to take omeprazole and famotidine daily Continues to use budesonide neb and saline nebs

## 2024-03-20 NOTE — CONSULT LETTER
[Courtesy Letter:] : I had the pleasure of seeing your patient, [unfilled], in my office today. [Dear  ___] : Dear  [unfilled], [Please see my note below.] : Please see my note below. [Consult Closing:] : Thank you very much for allowing me to participate in the care of this patient.  If you have any questions, please do not hesitate to contact me. [Sincerely,] : Sincerely, [FreeTextEntry2] : Padilla Ley MD  [FreeTextEntry3] : Anthony Hernandez MD, PhD Chief, Division of Laryngology Department of Otolaryngology [facility] Pediatric Otolaryngology, [facility]  of Otolaryngology [employer] [facility]

## 2024-03-29 ENCOUNTER — RX RENEWAL (OUTPATIENT)
Age: 69
End: 2024-03-29

## 2024-03-29 RX ORDER — BUDESONIDE 0.5 MG/2ML
0.5 INHALANT ORAL TWICE DAILY
Qty: 60 | Refills: 0 | Status: ACTIVE | COMMUNITY
Start: 2024-02-07 | End: 1900-01-01

## 2024-05-25 ENCOUNTER — RX RENEWAL (OUTPATIENT)
Age: 69
End: 2024-05-25

## 2024-05-25 RX ORDER — FAMOTIDINE 40 MG/1
40 TABLET, FILM COATED ORAL
Qty: 90 | Refills: 0 | Status: ACTIVE | COMMUNITY
Start: 2020-11-19 | End: 1900-01-01

## 2024-06-05 ENCOUNTER — APPOINTMENT (OUTPATIENT)
Dept: FAMILY MEDICINE | Facility: CLINIC | Age: 69
End: 2024-06-05
Payer: MEDICARE

## 2024-06-05 VITALS
WEIGHT: 159.5 LBS | SYSTOLIC BLOOD PRESSURE: 117 MMHG | TEMPERATURE: 97.4 F | BODY MASS INDEX: 31.31 KG/M2 | DIASTOLIC BLOOD PRESSURE: 70 MMHG | OXYGEN SATURATION: 98 % | HEIGHT: 60 IN | HEART RATE: 62 BPM

## 2024-06-05 DIAGNOSIS — J44.9 CHRONIC OBSTRUCTIVE PULMONARY DISEASE, UNSPECIFIED: ICD-10-CM

## 2024-06-05 DIAGNOSIS — I10 ESSENTIAL (PRIMARY) HYPERTENSION: ICD-10-CM

## 2024-06-05 DIAGNOSIS — M85.80 OTHER SPECIFIED DISORDERS OF BONE DENSITY AND STRUCTURE, UNSPECIFIED SITE: ICD-10-CM

## 2024-06-05 DIAGNOSIS — J38.6 STENOSIS OF LARYNX: ICD-10-CM

## 2024-06-05 DIAGNOSIS — J38.01 PARALYSIS OF VOCAL CORDS AND LARYNX, UNILATERAL: ICD-10-CM

## 2024-06-05 DIAGNOSIS — E74.39 OTHER DISORDERS OF INTESTINAL CARBOHYDRATE ABSORPTION: ICD-10-CM

## 2024-06-05 PROCEDURE — 99214 OFFICE O/P EST MOD 30 MIN: CPT

## 2024-06-05 RX ORDER — LOSARTAN POTASSIUM AND HYDROCHLOROTHIAZIDE 12.5; 1 MG/1; MG/1
100-12.5 TABLET ORAL
Qty: 90 | Refills: 0 | Status: ACTIVE | COMMUNITY
Start: 1900-01-01 | End: 1900-01-01

## 2024-06-05 NOTE — PLAN
[FreeTextEntry1] : This very pleasant 68-year-old female presents to renew medication Osteopenia-osteopenia fracture discussed.  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

## 2024-06-05 NOTE — HEALTH RISK ASSESSMENT
[0] : 2) Feeling down, depressed, or hopeless: Not at all (0) [PHQ-2 Negative - No further assessment needed] : PHQ-2 Negative - No further assessment needed [[id number]] : 0

## 2024-06-26 ENCOUNTER — RX RENEWAL (OUTPATIENT)
Age: 69
End: 2024-06-26

## 2024-06-26 RX ORDER — OMEPRAZOLE 20 MG/1
20 CAPSULE, DELAYED RELEASE ORAL DAILY
Qty: 90 | Refills: 0 | Status: ACTIVE | COMMUNITY
Start: 2022-09-28 | End: 1900-01-01

## 2024-07-24 ENCOUNTER — APPOINTMENT (OUTPATIENT)
Dept: OTOLARYNGOLOGY | Facility: CLINIC | Age: 69
End: 2024-07-24

## 2024-07-24 VITALS
WEIGHT: 159 LBS | OXYGEN SATURATION: 100 % | DIASTOLIC BLOOD PRESSURE: 61 MMHG | SYSTOLIC BLOOD PRESSURE: 116 MMHG | HEART RATE: 71 BPM | HEIGHT: 60 IN | BODY MASS INDEX: 31.22 KG/M2

## 2024-07-24 PROCEDURE — 99214 OFFICE O/P EST MOD 30 MIN: CPT | Mod: 25

## 2024-07-24 PROCEDURE — 31579 LARYNGOSCOPY TELESCOPIC: CPT

## 2024-08-24 ENCOUNTER — RX RENEWAL (OUTPATIENT)
Age: 69
End: 2024-08-24

## 2024-09-06 ENCOUNTER — APPOINTMENT (OUTPATIENT)
Dept: FAMILY MEDICINE | Facility: CLINIC | Age: 69
End: 2024-09-06
Payer: MEDICARE

## 2024-09-06 VITALS
WEIGHT: 159 LBS | RESPIRATION RATE: 16 BRPM | TEMPERATURE: 97.9 F | HEIGHT: 60 IN | SYSTOLIC BLOOD PRESSURE: 136 MMHG | HEART RATE: 82 BPM | DIASTOLIC BLOOD PRESSURE: 80 MMHG | BODY MASS INDEX: 31.22 KG/M2 | OXYGEN SATURATION: 97 %

## 2024-09-06 DIAGNOSIS — M85.80 OTHER SPECIFIED DISORDERS OF BONE DENSITY AND STRUCTURE, UNSPECIFIED SITE: ICD-10-CM

## 2024-09-06 DIAGNOSIS — R79.89 OTHER SPECIFIED ABNORMAL FINDINGS OF BLOOD CHEMISTRY: ICD-10-CM

## 2024-09-06 DIAGNOSIS — J44.9 CHRONIC OBSTRUCTIVE PULMONARY DISEASE, UNSPECIFIED: ICD-10-CM

## 2024-09-06 DIAGNOSIS — J38.6 STENOSIS OF LARYNX: ICD-10-CM

## 2024-09-06 PROCEDURE — 99214 OFFICE O/P EST MOD 30 MIN: CPT

## 2024-09-06 NOTE — PLAN
[FreeTextEntry1] : 68 y o female for evaluation bp---   136/80    losartan hct  od    good compliance              med renewal  vocla cord paralysis                stenosis    copd--   hypoxia   symptomatic treatment

## 2024-10-24 NOTE — H&P PST ADULT - RESPIRATORY AND THORAX
LVM in regards to seeing if pt would like to r/s cancelled appt from 10/18 w/ Dr. [place]     [place], [place] ext [phone]   details…

## 2024-11-27 ENCOUNTER — RX RENEWAL (OUTPATIENT)
Age: 69
End: 2024-11-27

## 2024-12-04 ENCOUNTER — RX RENEWAL (OUTPATIENT)
Age: 69
End: 2024-12-04

## 2024-12-05 ENCOUNTER — APPOINTMENT (OUTPATIENT)
Dept: FAMILY MEDICINE | Facility: CLINIC | Age: 69
End: 2024-12-05
Payer: MEDICARE

## 2024-12-05 VITALS
SYSTOLIC BLOOD PRESSURE: 122 MMHG | HEART RATE: 76 BPM | OXYGEN SATURATION: 98 % | TEMPERATURE: 97.6 F | DIASTOLIC BLOOD PRESSURE: 70 MMHG | WEIGHT: 159 LBS | BODY MASS INDEX: 31.22 KG/M2 | RESPIRATION RATE: 16 BRPM | HEIGHT: 60 IN

## 2024-12-05 DIAGNOSIS — J44.9 CHRONIC OBSTRUCTIVE PULMONARY DISEASE, UNSPECIFIED: ICD-10-CM

## 2024-12-05 DIAGNOSIS — J38.6 STENOSIS OF LARYNX: ICD-10-CM

## 2024-12-05 DIAGNOSIS — E74.39 OTHER DISORDERS OF INTESTINAL CARBOHYDRATE ABSORPTION: ICD-10-CM

## 2024-12-05 DIAGNOSIS — M85.80 OTHER SPECIFIED DISORDERS OF BONE DENSITY AND STRUCTURE, UNSPECIFIED SITE: ICD-10-CM

## 2024-12-05 DIAGNOSIS — R49.0 DYSPHONIA: ICD-10-CM

## 2024-12-05 DIAGNOSIS — I10 ESSENTIAL (PRIMARY) HYPERTENSION: ICD-10-CM

## 2024-12-05 PROCEDURE — 99214 OFFICE O/P EST MOD 30 MIN: CPT

## 2024-12-05 RX ORDER — PREDNISONE 10 MG/1
10 TABLET ORAL
Qty: 25 | Refills: 1 | Status: ACTIVE | COMMUNITY
Start: 2024-12-05 | End: 1900-01-01

## 2024-12-10 ENCOUNTER — APPOINTMENT (OUTPATIENT)
Dept: OTOLARYNGOLOGY | Facility: CLINIC | Age: 69
End: 2024-12-10

## 2024-12-16 ENCOUNTER — RX RENEWAL (OUTPATIENT)
Age: 69
End: 2024-12-16

## 2025-01-21 ENCOUNTER — RX RENEWAL (OUTPATIENT)
Age: 70
End: 2025-01-21

## 2025-02-05 ENCOUNTER — APPOINTMENT (OUTPATIENT)
Dept: OTOLARYNGOLOGY | Facility: CLINIC | Age: 70
End: 2025-02-05

## 2025-02-05 VITALS
OXYGEN SATURATION: 99 % | BODY MASS INDEX: 31.22 KG/M2 | HEART RATE: 73 BPM | SYSTOLIC BLOOD PRESSURE: 120 MMHG | DIASTOLIC BLOOD PRESSURE: 54 MMHG | WEIGHT: 159 LBS | HEIGHT: 60 IN

## 2025-02-05 PROCEDURE — 99214 OFFICE O/P EST MOD 30 MIN: CPT | Mod: 25

## 2025-02-05 PROCEDURE — 31579 LARYNGOSCOPY TELESCOPIC: CPT

## 2025-02-05 RX ORDER — AZITHROMYCIN 250 MG/1
250 TABLET, FILM COATED ORAL
Qty: 90 | Refills: 1 | Status: ACTIVE | COMMUNITY
Start: 2025-02-05 | End: 1900-01-01

## 2025-02-24 ENCOUNTER — OUTPATIENT (OUTPATIENT)
Dept: OUTPATIENT SERVICES | Facility: HOSPITAL | Age: 70
LOS: 1 days | End: 2025-02-24

## 2025-02-24 VITALS
WEIGHT: 136.03 LBS | HEIGHT: 60 IN | SYSTOLIC BLOOD PRESSURE: 102 MMHG | HEART RATE: 72 BPM | OXYGEN SATURATION: 99 % | RESPIRATION RATE: 16 BRPM | TEMPERATURE: 98 F | DIASTOLIC BLOOD PRESSURE: 69 MMHG

## 2025-02-24 DIAGNOSIS — Z98.890 OTHER SPECIFIED POSTPROCEDURAL STATES: Chronic | ICD-10-CM

## 2025-02-24 DIAGNOSIS — J38.7 OTHER DISEASES OF LARYNX: Chronic | ICD-10-CM

## 2025-02-24 DIAGNOSIS — J39.8 OTHER SPECIFIED DISEASES OF UPPER RESPIRATORY TRACT: ICD-10-CM

## 2025-02-24 DIAGNOSIS — J38.6 STENOSIS OF LARYNX: ICD-10-CM

## 2025-02-24 DIAGNOSIS — Z98.89 OTHER SPECIFIED POSTPROCEDURAL STATES: Chronic | ICD-10-CM

## 2025-02-24 DIAGNOSIS — R49.0 DYSPHONIA: ICD-10-CM

## 2025-02-24 RX ORDER — SODIUM CHLORIDE 9 G/1000ML
1000 INJECTION, SOLUTION INTRAVENOUS
Refills: 0 | Status: DISCONTINUED | OUTPATIENT
Start: 2025-03-04 | End: 2025-03-18

## 2025-02-24 NOTE — H&P PST ADULT - NEGATIVE GASTROINTESTINAL SYMPTOMS
Spoke with patient in regards to scheduling clinic appointment. She is unable to come for office visit.  Do you want any imaging on her?   no nausea/no vomiting/no diarrhea/no constipation/no change in bowel habits/no abdominal pain/no melena/no jaundice/no hiccoughs

## 2025-02-24 NOTE — H&P PST ADULT - PROBLEM SELECTOR PLAN 2
Pt instructed to take Losartan with HCTZ with a sip of water A.M of surgery. Pt verbalized understanding

## 2025-02-24 NOTE — H&P PST ADULT - RESPIRATORY
no wheezes/no rales/no rhonchi/no respiratory distress/no subcutaneous emphysema/airway patent/breath sounds equal/good air movement/respirations non-labored/no dull ness or hyperresonance to percussion/stridor

## 2025-02-24 NOTE — H&P PST ADULT - PROBLEM SELECTOR PLAN 1
Schedule for microdirect laryngoscopy with excision of stenosis/ Bronchoscopy with excision stenosis/ Injection laryngoplasty on 3/4/25 Pre op instructions given and explained.     Instructed to take Omeprazole AM dose DOS    Instructed to continue Azithromycin as prescribed 3 times a week.  Also instructed to take Budesonide AM dose DOS.  Pt verbalized understanding. Schedule for microdirect laryngoscopy with excision of stenosis/ Bronchoscopy with excision stenosis/ Injection laryngoplasty, flexible bronchoscopy/possible dilation on 3/4/25 Pre op instructions given and explained.     Instructed to take Omeprazole AM dose DOS    Instructed to continue Azithromycin as prescribed 3 times a week.  Also instructed to take Budesonide  & Saline AM dose DOS.  Pt verbalized understanding.

## 2025-02-24 NOTE — H&P PST ADULT - NSICDXPASTSURGICALHX_GEN_ALL_CORE_FT
PAST SURGICAL HISTORY:  History of bronchoscopy     History of laryngoscopy bronchoscopy with excision stenosis;  12/09/2019    History of laryngoscopy     Larynx disorder Laser surgery 04/2010, 02/2012, 02/2013, 4/2015, 1/2018  Balloon dilation 11/2018

## 2025-02-24 NOTE — H&P PST ADULT - NSICDXPASTMEDICALHX_GEN_ALL_CORE_FT
PAST MEDICAL HISTORY:  Benign neoplasm of larynx     Dysphonia     Hypertension     Larynx disorder Stenosis of larynx    Latex allergy     Obesity     Other specified diseases of upper respiratory tract     Stenosis of larynx     Stridor     Tracheal/bronchial disease     Voice disturbance

## 2025-02-24 NOTE — H&P PST ADULT - RESPIRATORY AND THORAX COMMENTS
chronic chronic, On Budesonide and sodium chloride inhalation Azithromycin 3 x week started Feb 5 2025 by Dr Hernandez

## 2025-02-24 NOTE — H&P PST ADULT - HISTORY OF PRESENT ILLNESS
69 yr old female with preop dx of Dysphonia, stenosis of larynx, presents to have [facility] eval for Microdirect laryngoscopy with excision stenosis/ Bronchoscopy with excision stenosis/ Injection laryngoplasty      She is s/p Micro direct laryngoscopy with excision of laryngeal stenosis, bronchoscopy with excision of tracheal stenosis, balloon dilation of laryngotracheal stenosis, laryngoscopy with therapeutic steroid injection 02/19/24  Recently had a mucous plug and went to [facility] because she could not breathe 1/30/2025 - was able to finally cough which moved it and was able to breathe again.     She reports voice has been unchanged--very hoarse.  69 yr old female with preop dx of Dysphonia, stenosis of larynx, presents to have [facility] eval for Microdirect laryngoscopy with excision stenosis/ Bronchoscopy with excision stenosis/ Injection laryngoplasty, flexible bronchoscopy/possible dilation.      She is s/p Micro direct laryngoscopy with excision of laryngeal stenosis, bronchoscopy with excision of tracheal stenosis, balloon dilation of laryngotracheal stenosis, laryngoscopy with therapeutic steroid injection 02/19/24  Recently had a mucous plug and went to [facility] ED because she could not breathe 1/30/2025 - was able to finally cough which moved it and was able to breathe again.     She reports voice has been unchanged--very hoarse.

## 2025-02-26 ENCOUNTER — APPOINTMENT (OUTPATIENT)
Dept: FAMILY MEDICINE | Facility: CLINIC | Age: 70
End: 2025-02-26
Payer: MEDICARE

## 2025-02-26 VITALS
TEMPERATURE: 97.3 F | HEIGHT: 60 IN | OXYGEN SATURATION: 98 % | DIASTOLIC BLOOD PRESSURE: 72 MMHG | WEIGHT: 141 LBS | SYSTOLIC BLOOD PRESSURE: 116 MMHG | HEART RATE: 79 BPM | RESPIRATION RATE: 14 BRPM | BODY MASS INDEX: 27.68 KG/M2

## 2025-02-26 DIAGNOSIS — J38.01 PARALYSIS OF VOCAL CORDS AND LARYNX, UNILATERAL: ICD-10-CM

## 2025-02-26 DIAGNOSIS — R49.0 DYSPHONIA: ICD-10-CM

## 2025-02-26 DIAGNOSIS — I10 ESSENTIAL (PRIMARY) HYPERTENSION: ICD-10-CM

## 2025-02-26 DIAGNOSIS — J38.6 STENOSIS OF LARYNX: ICD-10-CM

## 2025-02-26 DIAGNOSIS — R79.89 OTHER SPECIFIED ABNORMAL FINDINGS OF BLOOD CHEMISTRY: ICD-10-CM

## 2025-02-26 PROCEDURE — 99214 OFFICE O/P EST MOD 30 MIN: CPT

## 2025-03-04 ENCOUNTER — APPOINTMENT (OUTPATIENT)
Dept: PULMONOLOGY | Facility: HOSPITAL | Age: 70
End: 2025-03-04
Payer: MEDICARE

## 2025-03-04 ENCOUNTER — OUTPATIENT (OUTPATIENT)
Dept: OUTPATIENT SERVICES | Facility: HOSPITAL | Age: 70
LOS: 1 days | Discharge: HOME | End: 2025-03-04
Payer: MEDICARE

## 2025-03-04 ENCOUNTER — TRANSCRIPTION ENCOUNTER (OUTPATIENT)
Age: 70
End: 2025-03-04

## 2025-03-04 ENCOUNTER — APPOINTMENT (OUTPATIENT)
Dept: OTOLARYNGOLOGY | Facility: HOSPITAL | Age: 70
End: 2025-03-04

## 2025-03-04 VITALS
HEIGHT: 60 IN | HEART RATE: 79 BPM | SYSTOLIC BLOOD PRESSURE: 146 MMHG | TEMPERATURE: 98 F | WEIGHT: 138.01 LBS | RESPIRATION RATE: 16 BRPM | OXYGEN SATURATION: 100 % | DIASTOLIC BLOOD PRESSURE: 67 MMHG

## 2025-03-04 VITALS
SYSTOLIC BLOOD PRESSURE: 113 MMHG | RESPIRATION RATE: 16 BRPM | HEART RATE: 68 BPM | OXYGEN SATURATION: 100 % | DIASTOLIC BLOOD PRESSURE: 59 MMHG

## 2025-03-04 DIAGNOSIS — Z98.890 OTHER SPECIFIED POSTPROCEDURAL STATES: Chronic | ICD-10-CM

## 2025-03-04 DIAGNOSIS — Z98.89 OTHER SPECIFIED POSTPROCEDURAL STATES: Chronic | ICD-10-CM

## 2025-03-04 DIAGNOSIS — J38.7 OTHER DISEASES OF LARYNX: Chronic | ICD-10-CM

## 2025-03-04 DIAGNOSIS — J39.8 OTHER SPECIFIED DISEASES OF UPPER RESPIRATORY TRACT: ICD-10-CM

## 2025-03-04 LAB
GRAM STN FLD: SIGNIFICANT CHANGE UP
NIGHT BLUE STAIN TISS: SIGNIFICANT CHANGE UP
SPECIMEN SOURCE: SIGNIFICANT CHANGE UP
SPECIMEN SOURCE: SIGNIFICANT CHANGE UP

## 2025-03-04 PROCEDURE — 31541 LARYNSCOP W/TUMR EXC + SCOPE: CPT | Mod: 59

## 2025-03-04 PROCEDURE — ZZZZZ: CPT

## 2025-03-04 PROCEDURE — 31645 BRNCHSC W/THER ASPIR 1ST: CPT | Mod: GC

## 2025-03-04 PROCEDURE — 31624 DX BRONCHOSCOPE/LAVAGE: CPT | Mod: GC

## 2025-03-04 PROCEDURE — 31630 BRONCHOSCOPY DILATE/FX REPR: CPT | Mod: GC

## 2025-03-04 PROCEDURE — 31641 BRONCHOSCOPY TREAT BLOCKAGE: CPT | Mod: GC

## 2025-03-04 PROCEDURE — 31641 BRONCHOSCOPY TREAT BLOCKAGE: CPT | Mod: 59

## 2025-03-04 PROCEDURE — 31640 BRONCHOSCOPY W/TUMOR EXCISE: CPT | Mod: XU,GC

## 2025-03-04 PROCEDURE — 31571 LARYNGOSCOP W/VC INJ + SCOPE: CPT | Mod: 59

## 2025-03-04 DEVICE — IMPLANTABLE DEVICE: Type: IMPLANTABLE DEVICE | Status: FUNCTIONAL

## 2025-03-04 DEVICE — BLLN CATH ELATION PULMONARY 12 13.5 15X55MM: Type: IMPLANTABLE DEVICE | Status: FUNCTIONAL

## 2025-03-04 DEVICE — BLLN CRE PULM 15 THRU 18MM X 5.5CM: Type: IMPLANTABLE DEVICE | Status: FUNCTIONAL

## 2025-03-04 DEVICE — GEL PROLARYN PLUS 1 CC SYR W TRANS ORAL NDL: Type: IMPLANTABLE DEVICE | Status: FUNCTIONAL

## 2025-03-04 RX ORDER — FENTANYL CITRATE-0.9 % NACL/PF 100MCG/2ML
25 SYRINGE (ML) INTRAVENOUS
Refills: 0 | Status: DISCONTINUED | OUTPATIENT
Start: 2025-03-04 | End: 2025-03-04

## 2025-03-04 RX ORDER — ONDANSETRON HCL/PF 4 MG/2 ML
4 VIAL (ML) INJECTION EVERY 6 HOURS
Refills: 0 | Status: DISCONTINUED | OUTPATIENT
Start: 2025-03-04 | End: 2025-03-18

## 2025-03-04 RX ORDER — AZITHROMYCIN 250 MG
1 CAPSULE ORAL
Refills: 0 | DISCHARGE

## 2025-03-04 RX ADMIN — Medication 25 MICROGRAM(S): at 13:43

## 2025-03-04 RX ADMIN — SODIUM CHLORIDE 30 MILLILITER(S): 9 INJECTION, SOLUTION INTRAVENOUS at 13:43

## 2025-03-04 NOTE — ASU DISCHARGE PLAN (ADULT/PEDIATRIC) - CARE PROVIDER_API CALL
Sher Sinclair  Pulmonary Disease  [address] [place] [address]  Phone: [phone]  Fax: [phone]  Follow Up Time: 2 weeks    Anthony Hernandez  Otolaryngology  [address] [place] [address]  Phone: [phone]  Fax: [phone]  Follow Up Time: 2 weeks

## 2025-03-04 NOTE — ASU DISCHARGE PLAN (ADULT/PEDIATRIC) - PROVIDER TOKENS
PROVIDER:[TOKEN:[[id number]],FOLLOWUP:[2 weeks]],PROVIDER:[TOKEN:[[id number]],FOLLOWUP:[2 weeks]]

## 2025-03-04 NOTE — BRIEF OPERATIVE NOTE - OPERATION/FINDINGS
Flexible bronchoscopy, BAL, excision of stenosis, electrocautery, steroid injection, balloon dilation up to 18, vocal fold injection.

## 2025-03-04 NOTE — ASU DISCHARGE PLAN (ADULT/PEDIATRIC) - NURSING INSTRUCTIONS
For the next 24-48 hours, avoid spicy, greasy, citrus, dairy, carbonated beverages & jagged edged foods like dry crackers and toast. Have fluids at room temperature. Any difficulty breathing or shortness of breath please call 911 immediately. DO NOT take any Tylenol (Acetaminophen) or narcotics containing Tylenol until after  6:30 pm______ . You received Tylenol during your operation and it can cause damage to your liver if too much is taken within a 24 hour time period.

## 2025-03-04 NOTE — ASU DISCHARGE PLAN (ADULT/PEDIATRIC) - NS MD DC FALL RISK RISK
For information on Fall & Injury Prevention, visit: [email or web address] OR  [email or web address] OR  https://www.cdc.gov/steadi/patient.html

## 2025-03-04 NOTE — ASU DISCHARGE PLAN (ADULT/PEDIATRIC) - A. DRIVE A CAR, OPERATE POWER TOOLS OR MACHINERY
Patient was last seen on 10/30/20, his last fasting labs where drawn on 10/23/20. Patient has upcoming MVW scheduled on 02/05/21 with fasting labs the week prior, ok to refill.   Statement Selected

## 2025-03-04 NOTE — ASU DISCHARGE PLAN (ADULT/PEDIATRIC) - ASU DC SPECIAL INSTRUCTIONSFT
some sore throat and post operative cough with trace amounts of blood is normal. if you experience any chest pain, shortness of breath or significant blood in your cough please call the office and go to the emergency room    please follow up results with Dr. Sinclair in 2-4 weeks    Pulmonary/Sleep Clinic  [address] [place] [address]  [phone]

## 2025-03-04 NOTE — ASU DISCHARGE PLAN (ADULT/PEDIATRIC) - FINANCIAL ASSISTANCE
[facility] provides services at a reduced cost to those who are determined to be eligible through [facility]’s financial assistance program. Information regarding [facility]’s financial assistance program can be found by going to [email or web address] or by calling [phone].

## 2025-03-05 LAB
CULTURE RESULTS: SIGNIFICANT CHANGE UP
SPECIMEN SOURCE: SIGNIFICANT CHANGE UP

## 2025-03-11 ENCOUNTER — NON-APPOINTMENT (OUTPATIENT)
Age: 70
End: 2025-03-11

## 2025-04-08 ENCOUNTER — APPOINTMENT (OUTPATIENT)
Dept: OTOLARYNGOLOGY | Facility: CLINIC | Age: 70
End: 2025-04-08

## 2025-04-08 VITALS — HEIGHT: 60 IN | WEIGHT: 141 LBS | BODY MASS INDEX: 27.68 KG/M2

## 2025-04-08 PROCEDURE — 99213 OFFICE O/P EST LOW 20 MIN: CPT | Mod: 25

## 2025-04-08 PROCEDURE — 31579 LARYNGOSCOPY TELESCOPIC: CPT

## 2025-05-09 NOTE — ASU PREOP CHECKLIST - TO WHOM
I personally performed the service described in the documentation recorded by the scribe in my presence, and it accurately and completely records my words and actions. [email or web address]

## 2025-06-19 ENCOUNTER — RX RENEWAL (OUTPATIENT)
Age: 70
End: 2025-06-19

## 2025-07-17 ENCOUNTER — RX RENEWAL (OUTPATIENT)
Age: 70
End: 2025-07-17

## 2025-09-03 ENCOUNTER — APPOINTMENT (OUTPATIENT)
Dept: FAMILY MEDICINE | Facility: CLINIC | Age: 70
End: 2025-09-03
Payer: MEDICARE

## 2025-09-03 VITALS
SYSTOLIC BLOOD PRESSURE: 130 MMHG | DIASTOLIC BLOOD PRESSURE: 70 MMHG | BODY MASS INDEX: 27.73 KG/M2 | HEART RATE: 72 BPM | RESPIRATION RATE: 16 BRPM | HEIGHT: 60 IN | OXYGEN SATURATION: 99 % | WEIGHT: 141.25 LBS

## 2025-09-03 DIAGNOSIS — M85.80 OTHER SPECIFIED DISORDERS OF BONE DENSITY AND STRUCTURE, UNSPECIFIED SITE: ICD-10-CM

## 2025-09-03 DIAGNOSIS — I10 ESSENTIAL (PRIMARY) HYPERTENSION: ICD-10-CM

## 2025-09-03 DIAGNOSIS — J44.9 CHRONIC OBSTRUCTIVE PULMONARY DISEASE, UNSPECIFIED: ICD-10-CM

## 2025-09-03 DIAGNOSIS — J38.6 STENOSIS OF LARYNX: ICD-10-CM

## 2025-09-03 PROCEDURE — 99214 OFFICE O/P EST MOD 30 MIN: CPT

## 2025-09-17 ENCOUNTER — APPOINTMENT (OUTPATIENT)
Dept: OTOLARYNGOLOGY | Facility: CLINIC | Age: 70
End: 2025-09-17

## 2025-09-17 VITALS
BODY MASS INDEX: 27.48 KG/M2 | WEIGHT: 140 LBS | DIASTOLIC BLOOD PRESSURE: 82 MMHG | SYSTOLIC BLOOD PRESSURE: 146 MMHG | HEIGHT: 60 IN | HEART RATE: 64 BPM

## (undated) DEVICE — SYR LUER LOK 3CC

## (undated) DEVICE — SOL IRR NS 0.9% 250ML

## (undated) DEVICE — Device

## (undated) DEVICE — POSITIONER PATIENT SAFETY STRAP 3X60"

## (undated) DEVICE — FORCEP RADIAL JAW 4 HOT BIOPSY DISP

## (undated) DEVICE — CATH FLEX SUCTION 5FR 60CM

## (undated) DEVICE — GLV 7.5 PROTEXIS (CREAM) MICRO

## (undated) DEVICE — SUTURE REMOVAL KIT

## (undated) DEVICE — SOL IRR POUR H2O 500ML

## (undated) DEVICE — KNIFE ELECTROSURGICAL 4X0.7MM 165CM

## (undated) DEVICE — CATH SUCTION 6FRX60CM

## (undated) DEVICE — LIJ-SATALOFF ANT COMMISSURE LARYNGOSCOPE: Type: DURABLE MEDICAL EQUIPMENT

## (undated) DEVICE — VALVE BIOPSY BRONCHOVIDEOSCOPE

## (undated) DEVICE — FORCEP BIOPSY 1.8MM JAW X 100CM DISP

## (undated) DEVICE — SOL ANTI FOG

## (undated) DEVICE — MASK SURGICAL WITH EYESHIELD ANTIFOG (ORANGE)

## (undated) DEVICE — LABELS BLANK W PEN

## (undated) DEVICE — VALVE SUCTION EVIS 160/200/240

## (undated) DEVICE — MADGIC LARYNGO-TRACHEAL MUCOSAL ATOMIZATION DEVICE WITH 3 ML SYRINGE

## (undated) DEVICE — PACK BRONCHOSCOPY

## (undated) DEVICE — VENODYNE/SCD SLEEVE CALF MEDIUM

## (undated) DEVICE — DRSG TELFA 3 X 8

## (undated) DEVICE — TUBING SUCTION NONCONDUCTIVE 6MM X 12FT

## (undated) DEVICE — SYR LUER SLIP TIP 30CC

## (undated) DEVICE — DRAPE TOWEL BLUE 17" X 24"

## (undated) DEVICE — PLATE NESSY 170

## (undated) DEVICE — WARMING BLANKET UPPER ADULT

## (undated) DEVICE — FORCEP BIOPSY BRONCHOSCOPE DISP

## (undated) DEVICE — GLV 7 PROTEXIS (WHITE)

## (undated) DEVICE — GLV 8.5 PROTEXIS (WHITE)

## (undated) DEVICE — DEVICE INFLATION AERIS DISP

## (undated) DEVICE — SOL ANTI FOG (FRED)

## (undated) DEVICE — TUBING CANNULA SALTER LABS NASAL ADULT 7FT

## (undated) DEVICE — DRSG TAPE TRANSPORE 1"

## (undated) DEVICE — TRAP SPECIMEN SPUTUM 40CC

## (undated) DEVICE — DRAPE 3/4 SHEET 52X76"

## (undated) DEVICE — ADAPTER FIBEROPTIC BRONCHOSCOPE DUAL AXIS SWIVEL

## (undated) DEVICE — CATH SUCTION 4FRX60CM